# Patient Record
Sex: FEMALE | Race: WHITE | NOT HISPANIC OR LATINO | URBAN - METROPOLITAN AREA
[De-identification: names, ages, dates, MRNs, and addresses within clinical notes are randomized per-mention and may not be internally consistent; named-entity substitution may affect disease eponyms.]

---

## 2019-05-04 ENCOUNTER — INPATIENT (INPATIENT)
Facility: HOSPITAL | Age: 64
LOS: 1 days | Discharge: ANOTHER HOSPITAL | DRG: 282 | End: 2019-05-06
Attending: FAMILY MEDICINE | Admitting: INTERNAL MEDICINE
Payer: COMMERCIAL

## 2019-05-04 VITALS
RESPIRATION RATE: 16 BRPM | HEIGHT: 64 IN | SYSTOLIC BLOOD PRESSURE: 180 MMHG | DIASTOLIC BLOOD PRESSURE: 101 MMHG | OXYGEN SATURATION: 98 % | TEMPERATURE: 99 F | HEART RATE: 95 BPM | WEIGHT: 128.09 LBS

## 2019-05-04 PROBLEM — Z00.00 ENCOUNTER FOR PREVENTIVE HEALTH EXAMINATION: Status: ACTIVE | Noted: 2019-05-04

## 2019-05-04 LAB
ALBUMIN SERPL ELPH-MCNC: 4 G/DL — SIGNIFICANT CHANGE UP (ref 3.3–5)
ALP SERPL-CCNC: 114 U/L — SIGNIFICANT CHANGE UP (ref 40–120)
ALT FLD-CCNC: 25 U/L — SIGNIFICANT CHANGE UP (ref 12–78)
ANION GAP SERPL CALC-SCNC: 8 MMOL/L — SIGNIFICANT CHANGE UP (ref 5–17)
APTT BLD: 32.3 SEC — SIGNIFICANT CHANGE UP (ref 28.5–37)
AST SERPL-CCNC: 21 U/L — SIGNIFICANT CHANGE UP (ref 15–37)
BILIRUB SERPL-MCNC: 0.3 MG/DL — SIGNIFICANT CHANGE UP (ref 0.2–1.2)
BUN SERPL-MCNC: 21 MG/DL — SIGNIFICANT CHANGE UP (ref 7–23)
CALCIUM SERPL-MCNC: 8.2 MG/DL — LOW (ref 8.5–10.1)
CHLORIDE SERPL-SCNC: 100 MMOL/L — SIGNIFICANT CHANGE UP (ref 96–108)
CK MB BLD-MCNC: 1 % — SIGNIFICANT CHANGE UP (ref 0–3.5)
CK MB CFR SERPL CALC: 1.2 NG/ML — SIGNIFICANT CHANGE UP (ref 0–3.6)
CK SERPL-CCNC: 125 U/L — SIGNIFICANT CHANGE UP (ref 26–192)
CO2 SERPL-SCNC: 27 MMOL/L — SIGNIFICANT CHANGE UP (ref 22–31)
CREAT SERPL-MCNC: 0.88 MG/DL — SIGNIFICANT CHANGE UP (ref 0.5–1.3)
GLUCOSE SERPL-MCNC: 122 MG/DL — HIGH (ref 70–99)
HCT VFR BLD CALC: 36.3 % — SIGNIFICANT CHANGE UP (ref 34.5–45)
HGB BLD-MCNC: 12.5 G/DL — SIGNIFICANT CHANGE UP (ref 11.5–15.5)
INR BLD: 0.91 RATIO — SIGNIFICANT CHANGE UP (ref 0.88–1.16)
MCHC RBC-ENTMCNC: 33.3 PG — SIGNIFICANT CHANGE UP (ref 27–34)
MCHC RBC-ENTMCNC: 34.4 GM/DL — SIGNIFICANT CHANGE UP (ref 32–36)
MCV RBC AUTO: 96.8 FL — SIGNIFICANT CHANGE UP (ref 80–100)
NRBC # BLD: 0 /100 WBCS — SIGNIFICANT CHANGE UP (ref 0–0)
PLATELET # BLD AUTO: 237 K/UL — SIGNIFICANT CHANGE UP (ref 150–400)
POTASSIUM SERPL-MCNC: 4.1 MMOL/L — SIGNIFICANT CHANGE UP (ref 3.5–5.3)
POTASSIUM SERPL-SCNC: 4.1 MMOL/L — SIGNIFICANT CHANGE UP (ref 3.5–5.3)
PROT SERPL-MCNC: 7.3 G/DL — SIGNIFICANT CHANGE UP (ref 6–8.3)
PROTHROM AB SERPL-ACNC: 10.4 SEC — SIGNIFICANT CHANGE UP (ref 10–12.9)
RBC # BLD: 3.75 M/UL — LOW (ref 3.8–5.2)
RBC # FLD: 12.6 % — SIGNIFICANT CHANGE UP (ref 10.3–14.5)
SODIUM SERPL-SCNC: 135 MMOL/L — SIGNIFICANT CHANGE UP (ref 135–145)
TROPONIN I SERPL-MCNC: <.015 NG/ML — SIGNIFICANT CHANGE UP (ref 0.01–0.04)
WBC # BLD: 9.55 K/UL — SIGNIFICANT CHANGE UP (ref 3.8–10.5)
WBC # FLD AUTO: 9.55 K/UL — SIGNIFICANT CHANGE UP (ref 3.8–10.5)

## 2019-05-04 PROCEDURE — 93010 ELECTROCARDIOGRAM REPORT: CPT

## 2019-05-04 PROCEDURE — 71045 X-RAY EXAM CHEST 1 VIEW: CPT | Mod: 26

## 2019-05-04 NOTE — ED PROVIDER NOTE - NOTES
agrees with trop x 2 and d/c,  fu with dr cooper this week consulted called to service for dr angela in am

## 2019-05-04 NOTE — ED PROVIDER NOTE - CHPI ED SYMPTOMS NEG
no nausea/no shortness of breath/no back pain/no cough/no dizziness/no fever/no syncope/no chills/no diaphoresis

## 2019-05-04 NOTE — ED PROVIDER NOTE - PROGRESS NOTE DETAILS
pt has never seen dr cooper, she was her mothers doctor, pt with elevated trop, will call consult with omer [facility] for am case evangelina Puente and SAUD Vang

## 2019-05-04 NOTE — ED PROVIDER NOTE - CARE PLAN
Principal Discharge DX:	Palpitations Principal Discharge DX:	Palpitations  Secondary Diagnosis:	Elevated troponin I level Principal Discharge DX:	NSTEMI (non-ST elevated myocardial infarction)  Secondary Diagnosis:	Elevated troponin I level

## 2019-05-04 NOTE — ED ADULT NURSE NOTE - OBJECTIVE STATEMENT
Patient alert and oriented X 3. Complaining of palpitation and chest pressure while watching tv at 8 pm. Denies  shortness of breath, nausea, vomiting, headache, dizziness and fever.

## 2019-05-04 NOTE — ED ADULT NURSE NOTE - CHPI ED NUR SYMPTOMS NEG
no chills/no fever/no congestion/no syncope/no diaphoresis/no dizziness/no nausea/no vomiting/no shortness of breath/no back pain

## 2019-05-04 NOTE — ED PROVIDER NOTE - CPE EDP RESP NORM
Called patient and spouse Valeria answered. Informed wife about study and stated understanding and had no questions or concerns at this time.    normal...

## 2019-05-04 NOTE — ED ADULT NURSE NOTE - CAS DISCH ACCOMP BY
Infant with diffuse bruising over entire body. Trunk, abdomen, and extremities with significant bruising. Prophylactic phototherapy initiated.   4/14 Bili 3.8/0.4; increased to double phototherapy.   4/15 T/D bili 4.4/0.4.   4/16 Bili 4.9/0.5.  4/17 T/D bili 3.3/1.0 (direct rising). Decreased to single phototherapy.   4/18 T/D 3.6/0.7 direct decreasing.   4/19 T/D essentially unchanged at 3.6/0.5.   4/21 Bili 2.5/0.9  4/22 Bili 1.8/0.7  Plan: Will discontinue single phototherapy. T/D bili in am.   Self

## 2019-05-04 NOTE — ED PROVIDER NOTE - OBJECTIVE STATEMENT
PT is a 65 yo female smoker, no other cardiac risk factors. pt states tonight with 15 minute episode of racing palpitations with about 15 seconds of chest pressure, no sob, leg pain or swelling, f/c, diaphoresis, dizziness. pt states sx resolved on own but came to get checked out    pmd: ayla

## 2019-05-05 DIAGNOSIS — Z29.9 ENCOUNTER FOR PROPHYLACTIC MEASURES, UNSPECIFIED: ICD-10-CM

## 2019-05-05 DIAGNOSIS — H04.123 DRY EYE SYNDROME OF BILATERAL LACRIMAL GLANDS: ICD-10-CM

## 2019-05-05 DIAGNOSIS — R74.8 ABNORMAL LEVELS OF OTHER SERUM ENZYMES: ICD-10-CM

## 2019-05-05 DIAGNOSIS — I21.4 NON-ST ELEVATION (NSTEMI) MYOCARDIAL INFARCTION: ICD-10-CM

## 2019-05-05 DIAGNOSIS — Z72.0 TOBACCO USE: ICD-10-CM

## 2019-05-05 LAB
CK MB BLD-MCNC: 1.1 % — SIGNIFICANT CHANGE UP (ref 0–3.5)
CK MB BLD-MCNC: 1.7 % — SIGNIFICANT CHANGE UP (ref 0–3.5)
CK MB CFR SERPL CALC: 1.1 NG/ML — SIGNIFICANT CHANGE UP (ref 0–3.6)
CK MB CFR SERPL CALC: 1.6 NG/ML — SIGNIFICANT CHANGE UP (ref 0–3.6)
CK SERPL-CCNC: 100 U/L — SIGNIFICANT CHANGE UP (ref 26–192)
CK SERPL-CCNC: 94 U/L — SIGNIFICANT CHANGE UP (ref 26–192)
TROPONIN I SERPL-MCNC: 0.05 NG/ML — HIGH (ref 0.01–0.04)
TROPONIN I SERPL-MCNC: 0.05 NG/ML — HIGH (ref 0.01–0.04)
TROPONIN I SERPL-MCNC: 0.08 NG/ML — HIGH (ref 0.01–0.04)
TROPONIN I SERPL-MCNC: 0.11 NG/ML — HIGH (ref 0.01–0.04)

## 2019-05-05 PROCEDURE — 99223 1ST HOSP IP/OBS HIGH 75: CPT | Mod: AI,GC

## 2019-05-05 PROCEDURE — 99223 1ST HOSP IP/OBS HIGH 75: CPT

## 2019-05-05 PROCEDURE — 99285 EMERGENCY DEPT VISIT HI MDM: CPT

## 2019-05-05 RX ORDER — ATORVASTATIN CALCIUM 80 MG/1
40 TABLET, FILM COATED ORAL AT BEDTIME
Qty: 0 | Refills: 0 | Status: DISCONTINUED | OUTPATIENT
Start: 2019-05-05 | End: 2019-05-06

## 2019-05-05 RX ORDER — ASPIRIN/CALCIUM CARB/MAGNESIUM 324 MG
325 TABLET ORAL ONCE
Qty: 0 | Refills: 0 | Status: COMPLETED | OUTPATIENT
Start: 2019-05-05 | End: 2019-05-05

## 2019-05-05 RX ORDER — ASPIRIN/CALCIUM CARB/MAGNESIUM 324 MG
325 TABLET ORAL DAILY
Qty: 0 | Refills: 0 | Status: DISCONTINUED | OUTPATIENT
Start: 2019-05-05 | End: 2019-05-06

## 2019-05-05 RX ORDER — CLOPIDOGREL BISULFATE 75 MG/1
300 TABLET, FILM COATED ORAL ONCE
Qty: 0 | Refills: 0 | Status: COMPLETED | OUTPATIENT
Start: 2019-05-05 | End: 2019-05-05

## 2019-05-05 RX ORDER — CLOPIDOGREL BISULFATE 75 MG/1
75 TABLET, FILM COATED ORAL DAILY
Qty: 0 | Refills: 0 | Status: DISCONTINUED | OUTPATIENT
Start: 2019-05-05 | End: 2019-05-06

## 2019-05-05 RX ORDER — ENOXAPARIN SODIUM 100 MG/ML
60 INJECTION SUBCUTANEOUS ONCE
Qty: 0 | Refills: 0 | Status: COMPLETED | OUTPATIENT
Start: 2019-05-05 | End: 2019-05-05

## 2019-05-05 RX ORDER — METOPROLOL TARTRATE 50 MG
25 TABLET ORAL DAILY
Qty: 0 | Refills: 0 | Status: DISCONTINUED | OUTPATIENT
Start: 2019-05-05 | End: 2019-05-06

## 2019-05-05 RX ORDER — ENOXAPARIN SODIUM 100 MG/ML
60 INJECTION SUBCUTANEOUS EVERY 12 HOURS
Qty: 0 | Refills: 0 | Status: DISCONTINUED | OUTPATIENT
Start: 2019-05-05 | End: 2019-05-06

## 2019-05-05 RX ADMIN — CLOPIDOGREL BISULFATE 300 MILLIGRAM(S): 75 TABLET, FILM COATED ORAL at 12:24

## 2019-05-05 RX ADMIN — ENOXAPARIN SODIUM 60 MILLIGRAM(S): 100 INJECTION SUBCUTANEOUS at 12:24

## 2019-05-05 RX ADMIN — ENOXAPARIN SODIUM 60 MILLIGRAM(S): 100 INJECTION SUBCUTANEOUS at 21:35

## 2019-05-05 RX ADMIN — ATORVASTATIN CALCIUM 40 MILLIGRAM(S): 80 TABLET, FILM COATED ORAL at 21:35

## 2019-05-05 RX ADMIN — Medication 325 MILLIGRAM(S): at 03:19

## 2019-05-05 NOTE — H&P ADULT - PROBLEM SELECTOR PLAN 1
Admit to telemetry. Consider NSTEMI/unstable angina  - Troponin trending upward: 0.82-->.110  - Continue to trend cardiac enzymes. Repeat ekg if patient symptomatic  - Continue asa 325mg qd  - Plavix load given in ED. Continue plavix 75mg qd  - Start full dose lovenox 60mg bid  - Start Toprol 25mg qd  - Check ECHO  - Cardio consulted- Dr. Vang following. Pt aware of possible cardiac cath at [place]. Continue to monitor for now. Admit to telemetry. Consider NSTEMI/unstable angina  - Troponin trending upward: 0.82-->.110  - Continue to trend cardiac enzymes. Repeat ekg if patient symptomatic  - Continue asa 325mg qd  - Plavix load given in ED. Continue plavix 75mg qd  - Start full dose lovenox 60mg bid  - Start Toprol 25mg qd  - Start statin. Check lipid profile in AM  - Check ECHO  - Cardio consulted- Dr. Vang following. Pt aware of possible cardiac cath at [place]. Continue to monitor for now. NPO after midnight.

## 2019-05-05 NOTE — H&P ADULT - NSHPPHYSICALEXAM_GEN_ALL_CORE
none General: Well developed, well nourished, NAD  HEENT: NCAT, PERRLA, EOMI bl, moist mucous membranes   Neck: Supple, nontender, no mass  Neurology: A&Ox3, nonfocal  Respiratory: CTA B/L, No W/R/R  CV: RRR, +S1/S2, no murmurs, rubs or gallops  Abdominal: Soft, NT, ND +BSx4, no guarding, no rebound  Extremities: No C/C/E, + peripheral pulses  MSK: Normal ROM, no joint erythema or warmth, no joint swelling   Skin: warm, dry, normal color

## 2019-05-05 NOTE — CONSULT NOTE ADULT - SUBJECTIVE AND OBJECTIVE BOX
CHIEF COMPLAINT: Patient is a 64y old  Female who presents with a chief complaint of  chest pain  HPI:  64 F hx of smoker, chronic back pain, hematuria presents with palpitations and chest pain. She was sitting at home watching TV and all od sudden felt palpitations that lasted for about 15 minutes and was self limiting. She noted about 10 seconds of pressure like substernal chest pain that was self limiting as well. She came to ED for evaluation.   AT baseline her ET is limited by chest pain. Denies any dyspnea, PND, orthopnea, near syncope, syncope, lower extremity edema, stroke like symptoms.     EKG: SR with nonspecific ST changes    REVIEW OF SYSTEMS:   All other review of systems are negative unless indicated above    PAST MEDICAL & SURGICAL HISTORY:  No pertinent past medical history  No significant past surgical history      SOCIAL HISTORY:  + tobacco no ethanol, or drug abuse.    FAMILY HISTORY:  + CAD with mom with CABG in 80s  No family history of acute MI or sudden cardiac death.    MEDICATIONS  (STANDING):    MEDICATIONS  (PRN):      Allergies    No Known Allergies    Intolerances        Home meds:  Home Medications:  Restasis 0.05% ophthalmic emulsion: 1 drop(s) to each affected eye every 12 hours (04 May 2019 21:25)        VITAL SIGNS:   Vital Signs Last 24 Hrs  T(C): 37 (05 May 2019 02:45), Max: 37 (04 May 2019 21:21)  T(F): 98.6 (05 May 2019 02:45), Max: 98.6 (04 May 2019 21:21)  HR: 77 (05 May 2019 05:50) (69 - 95)  BP: 117/57 (05 May 2019 05:50) (117/57 - 180/101)  BP(mean): --  RR: 16 (05 May 2019 05:50) (16 - 16)  SpO2: 99% (05 May 2019 05:50) (98% - 99%)    I&O's Summary      On Exam:     Constitutional: NAD, awake and alert, well-developed  HEENT: Moist Mucous Membranes, Anicteric  Pulmonary: Non-labored, breath sounds are clear bilaterally, No wheezing, rales or rhonchi  Cardiovascular: Regular, S1 and S2, No murmurs, rubs, gallops or clicks  Gastrointestinal: Bowel Sounds present, soft, nontender.   Lymph: No peripheral edema. No lymphadenopathy.  Skin: No visible rashes or ulcers.  Psych:  Mood & affect appropriate    LABS: All Labs Reviewed:                        12.5   9.55  )-----------( 237      ( 04 May 2019 21:45 )             36.3     04 May 2019 21:45    135    |  100    |  21     ----------------------------<  122    4.1     |  27     |  0.88     Ca    8.2        04 May 2019 21:45    TPro  7.3    /  Alb  4.0    /  TBili  0.3    /  DBili  x      /  AST  21     /  ALT  25     /  AlkPhos  114    04 May 2019 21:45    PT/INR - ( 04 May 2019 21:45 )   PT: 10.4 sec;   INR: 0.91 ratio         PTT - ( 04 May 2019 21:45 )  PTT:32.3 sec  CARDIAC MARKERS ( 05 May 2019 05:50 )  .110 ng/mL / x     / x     / x     / x      CARDIAC MARKERS ( 05 May 2019 00:17 )  .082 ng/mL / x     / x     / x     / x      CARDIAC MARKERS ( 04 May 2019 21:45 )  <.015 ng/mL / x     / 125 U/L / x     / 1.2 ng/mL      Blood Culture:         RADIOLOGY:

## 2019-05-05 NOTE — ED ADULT NURSE REASSESSMENT NOTE - ANCILLARY STATUS
cardiovascular tests pending/lab results pending/cath cath tomorrow, another set of cardiac enzymes at 2200

## 2019-05-05 NOTE — H&P ADULT - NSHPSOCIALHISTORY_GEN_ALL_CORE
Lives with .   Retired.   Tobacco: current tobacco smoker, 7-8 cigs for ~ 30 years  Etoh: denies  Recreational drugs: smokes marijuana, last smoked 5/4

## 2019-05-05 NOTE — ED ADULT NURSE REASSESSMENT NOTE - NSIMPLEMENTINTERV_GEN_ALL_ED
Implemented All Fall with Harm Risk Interventions:  [place] to call system. Call bell, personal items and telephone within reach. Instruct patient to call for assistance. Room bathroom lighting operational. Non-slip footwear when patient is off stretcher. Physically safe environment: no spills, clutter or unnecessary equipment. Stretcher in lowest position, wheels locked, appropriate side rails in place. Provide visual cue, wrist band, yellow gown, etc. Monitor gait and stability. Monitor for mental status changes and reorient to person, place, and time. Review medications for side effects contributing to fall risk. Reinforce activity limits and safety measures with patient and family. Provide visual clues: red socks.

## 2019-05-05 NOTE — H&P ADULT - HISTORY OF PRESENT ILLNESS
64F w/pmh of chronic back pain and current tobacco smoker presenting with heart racing and mild chest pressure since last night at 8:30pm. States that she was in her usual state of health, watching television when she felt her heart beating very fast and admits to mild midsternal chest pressure without radiation, numbness or tingling. Chest pressure is not reproducible and pt denies chest pain. This episode lasted 15 minutes and started to improve by itself. She did not take any medications to improve her symptoms. States that she felt her heart racing once before a couple of months ago when she got up too fast and this episode was relieved after lying down and resting. Denies fevers/chills, n/v, chest pain, sob, cough, abdominal pain, constipation/diarrhea, weakness or muscle aches. No recent travel or sick contacts. Currently smokes 7-8 cigarettes daily for ~30 years.     In the ED, T98.6F, HR 69, /56, RR 16 and SpO2 98% on room air. Significant labs include: CBC wnl, CMP wnl, first set of troponin negative but trended up to 0.82-->.110. She was givent Lovenox 60mg x1, Plavix 300mg PO x1, asa 325mg x1.  EKG: NSR, no ST elevations/depression, repeat ekg showed NSR, Hr 95, no ST elevation/depression  CXR: prelim read no opacities/consolidations. Pending official read.

## 2019-05-05 NOTE — H&P ADULT - ASSESSMENT
64F w/pmh of chronic back pain and current tobacco smoker presenting with heart racing and mild chest pressure admitted with palpitations, atypical chest pain, and elevated troponin.

## 2019-05-05 NOTE — CONSULT NOTE ADULT - ASSESSMENT
64 F hx of smoker, chronic back pain, hematuria presents with palpitations and chest pain.   - Now with mild trop  - Trend [place]  - Please add CPK/CKMB to drawn trops  - Start ASA 325mg Qday  - Give Plavix 300mg x 1 and then 75mg Qday  - Would give full dose lovenox for now  - Start Toprol 25mg Qday  - Admit to tele  - check echo  - Monitor and replete electrolytes. Keep K>4.0 and Mg>2.0.   - Spoke at length with pt and  regarding ischemic evaluation. Offered cardiac cath.   - Further cardiac workup will depend on clinical course.   - All other workup per primary team. Will followup.

## 2019-05-05 NOTE — ED ADULT NURSE REASSESSMENT NOTE - NS ED NURSE REASSESS COMMENT FT1
Patient ambulated multiple times with steady gait to bathroom. No palpitations or chest pressure at this time. NSR noted on cardiac monitor. Respirations even and not labored. Pending repeat troponin and cardiology consult. Carli ANDINO
pt maintained, denies chest pain/palpitations. VSS. No diaphoresis. Pt remains on CM. Pending bed placement.
Report taken from Rosas ALTMAN RN. Pt received alert and oriented x 4, denies any chest pain, sob, dizziness, palpitations. VSS. CE due 2200. Cardiac Cath tomorrow, NPO status effective at 2359 tonight. 20 g right ac. No s/s of acute distress.

## 2019-05-05 NOTE — H&P ADULT - NSHPREVIEWOFSYSTEMS_GEN_ALL_CORE
Constitutional: denies fever, chills, diaphoresis   HEENT: denies blurry vision, difficulty hearing  Respiratory: denies SOB, MARTINEZ, cough, sputum production, wheezing, hemoptysis  Cardiovascular: denies CP, palpitations, edema  Gastrointestinal: denies nausea, vomiting, diarrhea, constipation, abdominal pain  Genitourinary: denies dysuria, frequency, urgency  Skin/Breast: denies rash, itching  Musculoskeletal: denies myalgias, joint swelling, muscle weakness  Neurologic: denies headache, weakness, dizziness, paresthesias, numbness/tingling  Psychiatric: denies feeling anxious

## 2019-05-05 NOTE — H&P ADULT - PROBLEM SELECTOR PLAN 3
Current smoker 7-8 cigs/daily. Offered nicotine patch and tobacco cessation. Pt not willing to quit currently. Does not require patch.

## 2019-05-05 NOTE — H&P ADULT - ATTENDING COMMENTS
pt is 64 year od, female with extensive h/o smoking admitted for ACS , elevated troponin   seen by card in er   c/w asa , plavix ,  beta blocker  , full dose anticoagulation with lovenox   trend and f/u cardiac enzymes   start on lipitor   possible cath in am   fall precaution

## 2019-05-05 NOTE — H&P ADULT - PROBLEM SELECTOR PLAN 4
Continue restasis eye drops bid. Asked patient to bring eye drops from home and/or offered eye drops if needed.

## 2019-05-05 NOTE — H&P ADULT - PROBLEM SELECTOR PLAN 5
IMPROVE VTE Individual Risk Assessment          RISK                                                          Points  [  ] Previous VTE                                                3  [  ] Thrombophilia                                             2  [  ] Lower limb paralysis                                   2        (unable to hold up >15 seconds)    [  ] Current Cancer                                             2         (within 6 months)  [  ] Immobilization > 24 hrs                              1  [  ] ICU/CCU stay > 24 hours                             1  [ x ] Age > 60                                                         1    IMPROVE VTE Score: 1    dvt ppx: full dose lovenox

## 2019-05-06 ENCOUNTER — INPATIENT (INPATIENT)
Facility: HOSPITAL | Age: 64
LOS: 0 days | Discharge: HOME | DRG: 287 | End: 2019-05-06
Attending: INTERNAL MEDICINE | Admitting: INTERNAL MEDICINE
Payer: COMMERCIAL

## 2019-05-06 ENCOUNTER — TRANSCRIPTION ENCOUNTER (OUTPATIENT)
Age: 64
End: 2019-05-06

## 2019-05-06 VITALS
RESPIRATION RATE: 17 BRPM | DIASTOLIC BLOOD PRESSURE: 62 MMHG | OXYGEN SATURATION: 97 % | HEART RATE: 74 BPM | SYSTOLIC BLOOD PRESSURE: 132 MMHG

## 2019-05-06 VITALS
TEMPERATURE: 98 F | SYSTOLIC BLOOD PRESSURE: 130 MMHG | OXYGEN SATURATION: 99 % | RESPIRATION RATE: 15 BRPM | HEART RATE: 64 BPM | DIASTOLIC BLOOD PRESSURE: 76 MMHG

## 2019-05-06 VITALS
RESPIRATION RATE: 17 BRPM | WEIGHT: 126.99 LBS | HEART RATE: 61 BPM | OXYGEN SATURATION: 99 % | HEIGHT: 64 IN | DIASTOLIC BLOOD PRESSURE: 86 MMHG | SYSTOLIC BLOOD PRESSURE: 144 MMHG | TEMPERATURE: 98 F

## 2019-05-06 DIAGNOSIS — F17.200 NICOTINE DEPENDENCE, UNSPECIFIED, UNCOMPLICATED: ICD-10-CM

## 2019-05-06 DIAGNOSIS — R94.30 ABNORMAL RESULT OF CARDIOVASCULAR FUNCTION STUDY, UNSPECIFIED: ICD-10-CM

## 2019-05-06 LAB
ANION GAP SERPL CALC-SCNC: 8 MMOL/L — SIGNIFICANT CHANGE UP (ref 5–17)
BUN SERPL-MCNC: 11 MG/DL — SIGNIFICANT CHANGE UP (ref 7–23)
CALCIUM SERPL-MCNC: 8.5 MG/DL — SIGNIFICANT CHANGE UP (ref 8.5–10.1)
CHLORIDE SERPL-SCNC: 107 MMOL/L — SIGNIFICANT CHANGE UP (ref 96–108)
CHOLEST SERPL-MCNC: 213 MG/DL — HIGH (ref 10–199)
CK MB BLD-MCNC: <1.1 % — SIGNIFICANT CHANGE UP (ref 0–3.5)
CK MB CFR SERPL CALC: <1 NG/ML — SIGNIFICANT CHANGE UP (ref 0–3.6)
CK SERPL-CCNC: 90 U/L — SIGNIFICANT CHANGE UP (ref 26–192)
CO2 SERPL-SCNC: 27 MMOL/L — SIGNIFICANT CHANGE UP (ref 22–31)
CREAT SERPL-MCNC: 0.69 MG/DL — SIGNIFICANT CHANGE UP (ref 0.5–1.3)
GLUCOSE SERPL-MCNC: 93 MG/DL — SIGNIFICANT CHANGE UP (ref 70–99)
HCT VFR BLD CALC: 36.2 % — SIGNIFICANT CHANGE UP (ref 34.5–45)
HCV AB S/CO SERPL IA: 0.18 S/CO — SIGNIFICANT CHANGE UP (ref 0–0.99)
HCV AB SERPL-IMP: SIGNIFICANT CHANGE UP
HDLC SERPL-MCNC: 73 MG/DL — SIGNIFICANT CHANGE UP
HGB BLD-MCNC: 12.3 G/DL — SIGNIFICANT CHANGE UP (ref 11.5–15.5)
LIPID PNL WITH DIRECT LDL SERPL: 125 MG/DL — HIGH
MCHC RBC-ENTMCNC: 32.5 PG — SIGNIFICANT CHANGE UP (ref 27–34)
MCHC RBC-ENTMCNC: 34 GM/DL — SIGNIFICANT CHANGE UP (ref 32–36)
MCV RBC AUTO: 95.8 FL — SIGNIFICANT CHANGE UP (ref 80–100)
NRBC # BLD: 0 /100 WBCS — SIGNIFICANT CHANGE UP (ref 0–0)
PLATELET # BLD AUTO: 219 K/UL — SIGNIFICANT CHANGE UP (ref 150–400)
POTASSIUM SERPL-MCNC: 4 MMOL/L — SIGNIFICANT CHANGE UP (ref 3.5–5.3)
POTASSIUM SERPL-SCNC: 4 MMOL/L — SIGNIFICANT CHANGE UP (ref 3.5–5.3)
RBC # BLD: 3.78 M/UL — LOW (ref 3.8–5.2)
RBC # FLD: 12.6 % — SIGNIFICANT CHANGE UP (ref 10.3–14.5)
SODIUM SERPL-SCNC: 142 MMOL/L — SIGNIFICANT CHANGE UP (ref 135–145)
TOTAL CHOLESTEROL/HDL RATIO MEASUREMENT: 2.9 RATIO — LOW (ref 3.3–7.1)
TRIGL SERPL-MCNC: 76 MG/DL — SIGNIFICANT CHANGE UP (ref 10–149)
TROPONIN I SERPL-MCNC: 0.03 NG/ML — SIGNIFICANT CHANGE UP (ref 0.01–0.04)
WBC # BLD: 5.77 K/UL — SIGNIFICANT CHANGE UP (ref 3.8–10.5)
WBC # FLD AUTO: 5.77 K/UL — SIGNIFICANT CHANGE UP (ref 3.8–10.5)

## 2019-05-06 PROCEDURE — 93010 ELECTROCARDIOGRAM REPORT: CPT

## 2019-05-06 PROCEDURE — 80053 COMPREHEN METABOLIC PANEL: CPT

## 2019-05-06 PROCEDURE — 82553 CREATINE MB FRACTION: CPT

## 2019-05-06 PROCEDURE — 85730 THROMBOPLASTIN TIME PARTIAL: CPT

## 2019-05-06 PROCEDURE — 82550 ASSAY OF CK (CPK): CPT

## 2019-05-06 PROCEDURE — 99152 MOD SED SAME PHYS/QHP 5/>YRS: CPT | Mod: 59,GC

## 2019-05-06 PROCEDURE — 80048 BASIC METABOLIC PNL TOTAL CA: CPT

## 2019-05-06 PROCEDURE — 93005 ELECTROCARDIOGRAM TRACING: CPT

## 2019-05-06 PROCEDURE — C1894: CPT

## 2019-05-06 PROCEDURE — 85610 PROTHROMBIN TIME: CPT

## 2019-05-06 PROCEDURE — C1887: CPT

## 2019-05-06 PROCEDURE — 99152 MOD SED SAME PHYS/QHP 5/>YRS: CPT

## 2019-05-06 PROCEDURE — 93454 CORONARY ARTERY ANGIO S&I: CPT

## 2019-05-06 PROCEDURE — 93454 CORONARY ARTERY ANGIO S&I: CPT | Mod: 26,GC

## 2019-05-06 PROCEDURE — 99285 EMERGENCY DEPT VISIT HI MDM: CPT | Mod: 25

## 2019-05-06 PROCEDURE — 80061 LIPID PANEL: CPT

## 2019-05-06 PROCEDURE — 86803 HEPATITIS C AB TEST: CPT

## 2019-05-06 PROCEDURE — 85027 COMPLETE CBC AUTOMATED: CPT

## 2019-05-06 PROCEDURE — 71045 X-RAY EXAM CHEST 1 VIEW: CPT

## 2019-05-06 PROCEDURE — 36415 COLL VENOUS BLD VENIPUNCTURE: CPT

## 2019-05-06 PROCEDURE — 84484 ASSAY OF TROPONIN QUANT: CPT

## 2019-05-06 PROCEDURE — 99239 HOSP IP/OBS DSCHRG MGMT >30: CPT | Mod: GC

## 2019-05-06 PROCEDURE — C1769: CPT

## 2019-05-06 PROCEDURE — 99233 SBSQ HOSP IP/OBS HIGH 50: CPT

## 2019-05-06 RX ORDER — ATORVASTATIN CALCIUM 80 MG/1
1 TABLET, FILM COATED ORAL
Qty: 0 | Refills: 0 | COMMUNITY
Start: 2019-05-06

## 2019-05-06 RX ORDER — CLOPIDOGREL BISULFATE 75 MG/1
1 TABLET, FILM COATED ORAL
Qty: 0 | Refills: 0 | COMMUNITY
Start: 2019-05-06

## 2019-05-06 RX ORDER — ASPIRIN/CALCIUM CARB/MAGNESIUM 324 MG
1 TABLET ORAL
Qty: 0 | Refills: 0 | COMMUNITY
Start: 2019-05-06

## 2019-05-06 RX ORDER — ENOXAPARIN SODIUM 100 MG/ML
1 INJECTION SUBCUTANEOUS
Qty: 0 | Refills: 0 | COMMUNITY

## 2019-05-06 RX ORDER — METOPROLOL TARTRATE 50 MG
1 TABLET ORAL
Qty: 0 | Refills: 0 | COMMUNITY
Start: 2019-05-06

## 2019-05-06 RX ADMIN — ENOXAPARIN SODIUM 60 MILLIGRAM(S): 100 INJECTION SUBCUTANEOUS at 05:53

## 2019-05-06 RX ADMIN — CLOPIDOGREL BISULFATE 75 MILLIGRAM(S): 75 TABLET, FILM COATED ORAL at 09:51

## 2019-05-06 RX ADMIN — Medication 25 MILLIGRAM(S): at 05:53

## 2019-05-06 RX ADMIN — Medication 325 MILLIGRAM(S): at 09:51

## 2019-05-06 NOTE — DISCHARGE NOTE PROVIDER - CARE PROVIDER_API CALL
Nhan Vang)  Internal Medicine  [address] [place] [address]  Phone: [phone]  Fax: [phone]  Follow Up Time:

## 2019-05-06 NOTE — ED ADULT NURSE REASSESSMENT NOTE - COMFORT CARE
darkened lights/side rails up/wait time explained/warm blanket provided
Informed pt of NPO at midnight/warm blanket provided/ambulated to bathroom/plan of care explained

## 2019-05-06 NOTE — DISCHARGE NOTE PROVIDER - CARE PROVIDER_API CALL
Nhan Vang)  Internal Medicine  [address] [place] [address]  Phone: [phone]  Fax: [phone]  Follow Up Time: 1 week    Gurpreet Barker  [address] [facility], [place] [address]  Phone: [phone]  Fax: (   )    -  Follow Up Time: 1 week

## 2019-05-06 NOTE — DISCHARGE NOTE NURSING/CASE MANAGEMENT/SOCIAL WORK - NSDCPEWEB_GEN_ALL_CORE
[facility] for Tobacco Control website --- [email or web address] website --- [email or web address]

## 2019-05-06 NOTE — PROGRESS NOTE ADULT - ASSESSMENT
64 F hx of smoker, chronic back pain, hematuria presents with palpitations and chest pain, ruled in for NSTEMI    - Needs transfer to tertiary facility today for coronary angiography in the management of her NSTEMI  - Continue DAPT with aspirin and Plavix  - AM Lovenox held  - Continue Toprol  - Continue statin  - Will need 2D echocardiogram  - Monitor and replete electrolytes. Keep K>4.0 and Mg>2.0.   - Further cardiac workup will depend on clinical course.   - To follow patient at

## 2019-05-06 NOTE — DISCHARGE NOTE NURSING/CASE MANAGEMENT/SOCIAL WORK - NSDCDPATPORTLINK_GEN_ALL_CORE
You can access the [facility] Patient Portal, offered by [facility], by registering with the following website: [email or web address]

## 2019-05-06 NOTE — DISCHARGE NOTE PROVIDER - PROVIDER TOKENS
PROVIDER:[TOKEN:[[id number]],FOLLOWUP:[1 week]],FREE:[LAST:[Lucero],FIRST:[Gurpreet],PHONE:[phone]],FAX:[(   )    -],ADDRESS:[[address] [place] [address]],FOLLOWUP:[1 week]]

## 2019-05-06 NOTE — DISCHARGE NOTE PROVIDER - HOSPITAL COURSE
64F w/ PMHx of chronic back pain and current tobacco smoker presenting with heart racing and mild chest pressure on the night of admission at 8:30pm. Stated that she was in her usual state of health, watching television when she felt her heart beating very fast and admitted to mild midsternal chest pressure without radiation, numbness or tingling. Chest pressure was not reproducible and pt denied chest pain. This episode lasted 15 minutes and started to improve by itself. She did not take any medications to improve her symptoms. Stated that she felt her heart racing once before a couple of months ago when she got up too fast and this episode was relieved after lying down and resting. Denies fevers/chills, n/v, chest pain, sob, cough, abdominal pain, constipation/diarrhea, weakness or muscle aches. No recent travel or sick contacts. Currently smokes 7-8 cigarettes daily for ~30 years.         In the ED, T98.6F, HR 69, /56, RR 16 and SpO2 98% on room air. Significant labs include: CBC wnl, CMP wnl, first set of troponin negative but trended up to 0.82-->.110. She was given Lovenox 60mg x1, Plavix 300mg PO x1, asa 325mg x1.    EKG: NSR, no ST elevations/depression, repeat ekg showed NSR, Hr 95, no ST elevation/depression    CXR:  No evidence for focal infiltrate or lobar consolidation.        The patient was admitted to telemetry for an NSTEMI. Cardiology, Dr. Vang was consulted. Cardiac enzymes were trended. Full dose Lovenox, Plavix and  metoprolol were started. Troponin trended x  6. Cardiology has recommended that the patient be transferred to [facility] for a cardiac catheterization for further management of her NSTEMI.  Lovenox was held.  The patient is stable for transfer to [facility] for further management.         Vital Signs Last 24 Hrs    T(C): 36.9 (06 May 2019 05:51), Max: 37.1 (05 May 2019 19:19)    T(F): 98.5 (06 May 2019 05:51), Max: 98.7 (05 May 2019 19:19)    HR: 76 (06 May 2019 05:51) (71 - 76)    BP: 131/76 (06 May 2019 05:51) (118/70 - 143/78)    BP(mean): --    RR: 16 (06 May 2019 05:51) (15 - 16)    SpO2: 99% (06 May 2019 05:51) (96% - 99%) 64F w/ PMHx of chronic back pain and current tobacco smoker presenting with heart racing and mild chest pressure on the night of admission at 8:30pm. Stated that she was in her usual state of health, watching television when she felt her heart beating very fast and admitted to mild midsternal chest pressure without radiation, numbness or tingling. Chest pressure was not reproducible and pt denied chest pain. This episode lasted 15 minutes and started to improve by itself. She did not take any medications to improve her symptoms. Stated that she felt her heart racing once before a couple of months ago when she got up too fast and this episode was relieved after lying down and resting. Denies fevers/chills, n/v, chest pain, sob, cough, abdominal pain, constipation/diarrhea, weakness or muscle aches. No recent travel or sick contacts. Currently smokes 7-8 cigarettes daily for ~30 years.         In the ED, T98.6F, HR 69, /56, RR 16 and SpO2 98% on room air. Significant labs include: CBC wnl, CMP wnl, first set of troponin negative but trended up to 0.82-->.110. She was given Lovenox 60mg x1, Plavix 300mg PO x1, asa 325mg x1.    EKG: NSR, no ST elevations/depression, repeat ekg showed NSR, Hr 95, no ST elevation/depression    CXR:  No evidence for focal infiltrate or lobar consolidation.        The patient was admitted to telemetry for an NSTEMI. Cardiology, Dr. Vang was consulted. Cardiac enzymes were trended. Full dose Lovenox, Plavix, statin and  metoprolol were started. Troponin trended x  6. Cardiology has recommended that the patient be transferred to [facility] for a cardiac catheterization for further management of her NSTEMI.  Lovenox was held.  The patient is stable for transfer to [facility] for further management.         Vital Signs Last 24 Hrs    T(C): 36.9 (06 May 2019 05:51), Max: 37.1 (05 May 2019 19:19)    T(F): 98.5 (06 May 2019 05:51), Max: 98.7 (05 May 2019 19:19)    HR: 76 (06 May 2019 05:51) (71 - 76)    BP: 131/76 (06 May 2019 05:51) (118/70 - 143/78)    BP(mean): --    RR: 16 (06 May 2019 05:51) (15 - 16)    SpO2: 99% (06 May 2019 05:51) (96% - 99%) 64F w/ PMHx of chronic back pain and current tobacco smoker presenting with heart racing and mild chest pressure on the night of admission at 8:30pm. Stated that she was in her usual state of health, watching television when she felt her heart beating very fast and admitted to mild midsternal chest pressure without radiation, numbness or tingling. Chest pressure was not reproducible and pt denied chest pain. This episode lasted 15 minutes and started to improve by itself. She did not take any medications to improve her symptoms. Stated that she felt her heart racing once before a couple of months ago when she got up too fast and this episode was relieved after lying down and resting. Denies fevers/chills, n/v, chest pain, sob, cough, abdominal pain, constipation/diarrhea, weakness or muscle aches. No recent travel or sick contacts. Currently smokes 7-8 cigarettes daily for ~30 years.         In the ED, T98.6F, HR 69, /56, RR 16 and SpO2 98% on room air. Significant labs include: CBC wnl, CMP wnl, first set of troponin negative but trended up to 0.82-->.110. She was given Lovenox 60mg x1, Plavix 300mg PO x1, asa 325mg x1.    EKG: NSR, no ST elevations/depression, repeat ekg showed NSR,     Hr 95, no ST elevation/depression    CXR:  No evidence for focal infiltrate or lobar consolidation.        The patient was admitted to telemetry for an d  cp  sec to NSTEMI. Cardiology as pt is active smoker,     Dr. Angela was consulted. Cardiac enzymes were trended down     Full dose Lovenox, Plavix, statin and  metoprolol were started    . Troponin  were  high sec to NSTEMI .     Cardiology  dr angela  has recommended that the patient be transferred to [facility] for a cardiac catheterization for further management of her     NSTEMI.  Lovenox was held.  The patient is stable for transfer to [facility] for further management. smoking cessation counseling done before dc.    physical exame day of dc        Vital Signs Last 24 Hrs    T(C): 36.9 (06 May 2019 05:51), Max: 37.1 (05 May 2019 19:19)    T(F): 98.5 (06 May 2019 05:51), Max: 98.7 (05 May 2019 19:19)    HR: 76 (06 May 2019 05:51) (71 - 76)    BP: 131/76 (06 May 2019 05:51) (118/70 - 143/78)    BP(mean): --    RR: 16 (06 May 2019 05:51) (15 - 16)    SpO2: 99% (06 May 2019 05:51) (96% - 99%)        physical exam  5-6-19 day of dc   Vital Signs Last 24 Hrs    T(C): 36.9 (06 May 2019 07:41), Max: 37.1 (05 May 2019 19:19)    T(F): 98.5 (06 May 2019 07:41), Max: 98.7 (05 May 2019 19:19)    HR: 64 (06 May 2019 07:41) (64 - 76)    BP: 130/76 (06 May 2019 07:41) (118/70 - 143/78)    BP(mean): --    RR: 15 (06 May 2019 07:41) (15 - 16)    SpO2: 99% (06 May 2019 07:41) (96% - 99%)      GEN no distress , HEENT nt/nc , perrla , CVS s1s2 no tachy ,     CHEST bl air entery  no wheezing , no rale , CNS aao/3 , GI soft , bs present , EXT no edema, pedal pulse present , SKIN no rash.

## 2019-05-06 NOTE — DISCHARGE NOTE PROVIDER - NSDCCPCAREPLAN_GEN_ALL_CORE_FT
PRINCIPAL DISCHARGE DIAGNOSIS  Diagnosis: NSTEMI (non-ST elevated myocardial infarction)  Assessment and Plan of Treatment:       SECONDARY DISCHARGE DIAGNOSES  Diagnosis: Elevated troponin I level  Assessment and Plan of Treatment: PRINCIPAL DISCHARGE DIAGNOSIS  Diagnosis: NSTEMI (non-ST elevated myocardial infarction)  Assessment and Plan of Treatment: You were admitted to the hospital for a non-STEMI. You were monitored on telemetry and your cardiac enzymes were trended. You were seen by cardiology, Dr. Vang. Aspirin, Plavix, atorvastatin, and metoprolol were started. It is recommended that you be transferred to [facility] for cardiac catheterization for further management of your NSTEMI. Please follow up with cardiology, Dr. Nhan Vang after discharge from the hospital.      SECONDARY DISCHARGE DIAGNOSES  Diagnosis: Elevated troponin I level  Assessment and Plan of Treatment: Your cardiac enzyme, troponin was elevated. This has trended downward. You are being transferred to [facility] for a cardiac catheterization for further management. PRINCIPAL DISCHARGE DIAGNOSIS  Diagnosis: NSTEMI (non-ST elevated myocardial infarction)  Assessment and Plan of Treatment: You were admitted to the hospital for a non-STEMI. You were monitored on telemetry and your cardiac enzymes were trended. You were seen by cardiology, Dr. Vang. Aspirin, Plavix, atorvastatin, and metoprolol were started. It is recommended that you be transferred to [facility] for cardiac catheterization for further management of your NSTEMI. Please follow up with cardiology, Dr. Nhan Vang after discharge from the hospital.      SECONDARY DISCHARGE DIAGNOSES  Diagnosis: Elevated troponin I level  Assessment and Plan of Treatment: Your cardiac enzyme, troponin was elevated. This has trended downward. You are being transferred to [facility] for a cardiac catheterization for further management.    Diagnosis: Current tobacco use  Assessment and Plan of Treatment: You are currently smoking 7-8 cigarettes daily. Education was provided on smoking cessation. Please follow up with your primary care doctor for further management.

## 2019-05-06 NOTE — DISCHARGE NOTE PROVIDER - NSDCCPTREATMENT_GEN_ALL_CORE_FT
PRINCIPAL PROCEDURE  Procedure: Left heart cardiac cath  Findings and Treatment: mild RCA disease (official report pending)

## 2019-05-06 NOTE — DISCHARGE NOTE NURSING/CASE MANAGEMENT/SOCIAL WORK - NSDCPEEMAIL_GEN_ALL_CORE
[facility] for Tobacco Control email [email or web address]

## 2019-05-06 NOTE — DISCHARGE NOTE PROVIDER - NSDCACTIVITY_GEN_ALL_CORE
Do not drive or operate machinery/Showering allowed/No heavy lifting/straining/Walking - Outdoors allowed/Walking - Indoors allowed/Bathing allowed

## 2019-05-06 NOTE — DISCHARGE NOTE PROVIDER - CARE PROVIDERS DIRECT ADDRESSES
[email or web address]

## 2019-05-06 NOTE — DISCHARGE NOTE PROVIDER - HOSPITAL COURSE
HPI:    65 y/o  female PMH chronic back pain, hematuria with negative outpatient urology workup, current smoker, presented to [facility] 5/4/19 with c/o heart racing and mild chest pressure. States that she was in her usual state of health, watching television when she felt her heart beating very fast and admits to mild midsternal chest pressure without radiation, numbness or tingling. Chest pressure was not reproducible. This episode lasted 15 minutes and self resolved. Denies fevers/chills, n/v, chest pain, sob, cough, abdominal pain, constipation/diarrhea, weakness or muscle aches. No recent travel or sick contacts. CXR with no acute findings.    Troponins mildly elevated. Cardiology consulted (Dr. Vang) and pt started on ASA/Plavix and Lovenox 60mg BID (last dose @0553 today - Dr. Leonard aware) for ACS. Transferred to [facility] today for cardiac catheterization. (06 May 2019 11:19)    Pt is s/p diagnostic LHC via RRA access revealing mild RCA disease (official report pending). Pt tolerated procedure well with no post complications and hospitalization remained uneventful. Pt is hemodynamically stable and insertion site benign. No c/o chest pain or SOB. Discharge teaching provided to patient and verbalized understanding of instructions. Pt is stable for discharge home as per attending.

## 2019-05-06 NOTE — DISCHARGE NOTE PROVIDER - NSDCCPCAREPLAN_GEN_ALL_CORE_FT
<p>Prior to commencing surgery patient identification, surgical procedure, site, and side were confirmed by Dr. Haris Lopez. Following topical proparacaine anesthesia, the patient was positioned at the YAG laser, a contact lens coupled to the cornea with methylcellulose and an axial posterior capsulotomy performed without complication using 3.2 Mj x 46. One drop of Alphagan was instilled and the patient returned to the holding area having tolerated the procedure well and without complication. </p><p>MRN [id number]</p> PRINCIPAL DISCHARGE DIAGNOSIS  Diagnosis: Chest pain  Assessment and Plan of Treatment: You will remain chest pain free and understand post cath discharge instructions  No heavy lifting or pushing/pulling with procedure arm for 2 weeks. No driving for 2 days. You may shower 24 hours following the procedure but avoid baths/swimming for 1 week. Check your wrist site for bleeding and/or swelling daily following procedure and call your doctor immediately if it occurs or if you experience increased pain at the site. Follow up with your cardiologist in 1-2 weeks. You may call [facility] if you have any questions/concerns regarding your procedure [phone].      SECONDARY DISCHARGE DIAGNOSES  Diagnosis: Hyperlipemia  Assessment and Plan of Treatment: Eat a heart healthy diet that is low in saturated fats and salt, and includes whole grains, fruits, vegetables and lean protein; exercise regularly (consult with your physician or cardiologist first); maintain a heart healthy weight; if you smoke - quit (A resource to help you stop smoking is the [facility] for Tobacco [facility] – phone number [phone].). Continue to follow with your primary physician or cardiologist.

## 2019-05-06 NOTE — H&P CARDIOLOGY - HISTORY OF PRESENT ILLNESS
63 y/o  female PMH chronic back pain, hematuria with negative outpatient urology workup, current smoker, presented to [facility] 5/4/19 with c/o heart racing and mild chest pressure. States that she was in her usual state of health, watching television when she felt her heart beating very fast and admits to mild midsternal chest pressure without radiation, numbness or tingling. Chest pressure was not reproducible. This episode lasted 15 minutes and self resolved. Denies fevers/chills, n/v, chest pain, sob, cough, abdominal pain, constipation/diarrhea, weakness or muscle aches. No recent travel or sick contacts. CXR with no acute findings.  Troponins mildly elevated. Cardiology consulted (Dr. Vang) and pt started on ASA/Plavix and Lovenox 60mg BID (last dose @0553 today - Dr. Leonard aware) for ACS. Transferred to [facility] today for cardiac catheterization.

## 2019-05-09 PROBLEM — R31.9 HEMATURIA, UNSPECIFIED: Chronic | Status: ACTIVE | Noted: 2019-05-06

## 2019-05-09 PROBLEM — H04.123 DRY EYE SYNDROME OF BILATERAL LACRIMAL GLANDS: Chronic | Status: ACTIVE | Noted: 2019-05-05

## 2019-05-09 PROBLEM — Z72.0 TOBACCO USE: Chronic | Status: ACTIVE | Noted: 2019-05-05

## 2019-05-09 PROBLEM — M54.9 DORSALGIA, UNSPECIFIED: Chronic | Status: ACTIVE | Noted: 2019-05-05

## 2019-05-20 ENCOUNTER — APPOINTMENT (OUTPATIENT)
Dept: CARDIOLOGY | Facility: CLINIC | Age: 64
End: 2019-05-20
Payer: COMMERCIAL

## 2019-05-20 ENCOUNTER — NON-APPOINTMENT (OUTPATIENT)
Age: 64
End: 2019-05-20

## 2019-05-20 VITALS
DIASTOLIC BLOOD PRESSURE: 74 MMHG | WEIGHT: 130 LBS | HEART RATE: 70 BPM | SYSTOLIC BLOOD PRESSURE: 139 MMHG | BODY MASS INDEX: 22.2 KG/M2 | HEIGHT: 64 IN | OXYGEN SATURATION: 94 %

## 2019-05-20 DIAGNOSIS — Z87.39 PERSONAL HISTORY OF OTHER DISEASES OF THE MUSCULOSKELETAL SYSTEM AND CONNECTIVE TISSUE: ICD-10-CM

## 2019-05-20 DIAGNOSIS — Z87.448 PERSONAL HISTORY OF OTHER DISEASES OF URINARY SYSTEM: ICD-10-CM

## 2019-05-20 DIAGNOSIS — Z82.49 FAMILY HISTORY OF ISCHEMIC HEART DISEASE AND OTHER DISEASES OF THE CIRCULATORY SYSTEM: ICD-10-CM

## 2019-05-20 DIAGNOSIS — F17.200 NICOTINE DEPENDENCE, UNSPECIFIED, UNCOMPLICATED: ICD-10-CM

## 2019-05-20 DIAGNOSIS — R00.2 PALPITATIONS: ICD-10-CM

## 2019-05-20 DIAGNOSIS — Z86.79 PERSONAL HISTORY OF OTHER DISEASES OF THE CIRCULATORY SYSTEM: ICD-10-CM

## 2019-05-20 PROCEDURE — 99215 OFFICE O/P EST HI 40 MIN: CPT

## 2019-05-20 PROCEDURE — 93000 ELECTROCARDIOGRAM COMPLETE: CPT

## 2019-05-20 NOTE — DISCUSSION/SUMMARY
[With Me] : with me [FreeTextEntry1] : Pippa is a 64 year old female here for evaluation.\par She recently presented with chest fluttering, and was found to have nonobstructive CAD.\par \par Her blood pressure is near goal. Her physical exam is unremarkable. Her EKG demonstrates a sinus rhythm without obvious ischemia or chamber enlargement.\par \par I think her mild troponin leak was present in the setting of palpitations and fast heart rates. She will be set up for a 30 day event monitor to evaluate these symptoms. She will have a 2-D echo to evaluate for any structural heart disease. I recommended that she take any aspirin 81 mg p.o. daily, and Lipitor. Her most recent LDL was 125. She would like to hold off on starting a cholesterol pill at this time, and we will attempt diet, exercise, and smoking cessation, which I have stressed the importance to reduce overall cv risk.\par I will call her with the results of these tests and arrange follow up.

## 2019-05-20 NOTE — HISTORY OF PRESENT ILLNESS
[FreeTextEntry1] : Pippa is a 64 year old female here for evaluation.\par \par She presented to to the hospital on 5/5/2019 with an episode of palpitations, and chest pounding after eating Chinese food. She was found to have a mild troponin leak, was treated for NSTEMI, and eventually transferred for cardiac catheterization. Her cardiac catheterization was notable for a 30% RCA lesion, but no obstructive coronary disease.\par \par She is here today for followup. She continues to smoke 7 cigarettes per day, though this has decreased in number. She denies significant chest pain, though reports 2 episodes of chest pounding and palpitations since the episode. These usually resolve within seconds to minutes. She is currently not on any medications. Her LDL in the hospital was 125.\par \par She has no lower extremity swelling, orthopnea, PND, dizziness, lightheadedness, and near syncope. She denies significant shortness of breath.

## 2019-05-20 NOTE — PHYSICAL EXAM
[General Appearance - Well Developed] : well developed [Normal Appearance] : normal appearance [Well Groomed] : well groomed [General Appearance - Well Nourished] : well nourished [No Deformities] : no deformities [General Appearance - In No Acute Distress] : no acute distress [Normal Conjunctiva] : the conjunctiva exhibited no abnormalities [Eyelids - No Xanthelasma] : the eyelids demonstrated no xanthelasmas [Normal Oral Mucosa] : normal oral mucosa [No Oral Pallor] : no oral pallor [No Oral Cyanosis] : no oral cyanosis [Normal Jugular Venous A Waves Present] : normal jugular venous A waves present [Normal Jugular Venous V Waves Present] : normal jugular venous V waves present [No Jugular Venous Argueta A Waves] : no jugular venous argueta A waves [Respiration, Rhythm And Depth] : normal respiratory rhythm and effort [Exaggerated Use Of Accessory Muscles For Inspiration] : no accessory muscle use [Auscultation Breath Sounds / Voice Sounds] : lungs were clear to auscultation bilaterally [Normal Rate] : normal [Normal S1] : normal S1 [Normal S2] : normal S2 [S3] : no S3 [S4] : no S4 [No Murmur] : no murmurs heard [Right Carotid Bruit] : no bruit heard over the right carotid [Left Carotid Bruit] : no bruit heard over the left carotid [Right Femoral Bruit] : no bruit heard over the right femoral artery [Left Femoral Bruit] : no bruit heard over the left femoral artery [2+] : left 2+ [No Abnormalities] : the abdominal aorta was not enlarged and no bruit was heard [No Pitting Edema] : no pitting edema present [Abdomen Soft] : soft [Abdomen Tenderness] : non-tender [Abdomen Mass (___ Cm)] : no abdominal mass palpated [Abnormal Walk] : normal gait [Gait - Sufficient For Exercise Testing] : the gait was sufficient for exercise testing [Nail Clubbing] : no clubbing of the fingernails [Cyanosis, Localized] : no localized cyanosis [Petechial Hemorrhages (___cm)] : no petechial hemorrhages [Skin Color & Pigmentation] : normal skin color and pigmentation [] : no rash [No Venous Stasis] : no venous stasis [Skin Lesions] : no skin lesions [No Skin Ulcers] : no skin ulcer [No Xanthoma] : no  xanthoma was observed [Oriented To Time, Place, And Person] : oriented to person, place, and time [Affect] : the affect was normal [Mood] : the mood was normal [No Anxiety] : not feeling anxious

## 2019-05-31 ENCOUNTER — APPOINTMENT (OUTPATIENT)
Dept: CARDIOLOGY | Facility: CLINIC | Age: 64
End: 2019-05-31
Payer: COMMERCIAL

## 2019-05-31 PROCEDURE — 93268 ECG RECORD/REVIEW: CPT

## 2019-06-06 ENCOUNTER — APPOINTMENT (OUTPATIENT)
Dept: CARDIOLOGY | Facility: CLINIC | Age: 64
End: 2019-06-06

## 2019-06-18 ENCOUNTER — APPOINTMENT (OUTPATIENT)
Dept: CARDIOLOGY | Facility: CLINIC | Age: 64
End: 2019-06-18
Payer: COMMERCIAL

## 2019-06-18 PROCEDURE — 93306 TTE W/DOPPLER COMPLETE: CPT

## 2019-08-21 LAB
ALBUMIN SERPL ELPH-MCNC: 4.6 G/DL
ALP BLD-CCNC: 75 U/L
ALT SERPL-CCNC: 15 U/L
ANION GAP SERPL CALC-SCNC: 11 MMOL/L
AST SERPL-CCNC: 19 U/L
BASOPHILS # BLD AUTO: 0.04 K/UL
BASOPHILS NFR BLD AUTO: 0.6 %
BILIRUB SERPL-MCNC: 0.6 MG/DL
BUN SERPL-MCNC: 13 MG/DL
CALCIUM SERPL-MCNC: 9.5 MG/DL
CHLORIDE SERPL-SCNC: 100 MMOL/L
CHOLEST SERPL-MCNC: 230 MG/DL
CHOLEST/HDLC SERPL: 2.9 RATIO
CO2 SERPL-SCNC: 26 MMOL/L
CREAT SERPL-MCNC: 0.8 MG/DL
EOSINOPHIL # BLD AUTO: 0.16 K/UL
EOSINOPHIL NFR BLD AUTO: 2.5 %
ESTIMATED AVERAGE GLUCOSE: 111 MG/DL
GLUCOSE SERPL-MCNC: 86 MG/DL
HBA1C MFR BLD HPLC: 5.5 %
HCT VFR BLD CALC: 38.3 %
HDLC SERPL-MCNC: 79 MG/DL
HGB BLD-MCNC: 12.9 G/DL
IMM GRANULOCYTES NFR BLD AUTO: 0.2 %
LDLC SERPL CALC-MCNC: 131 MG/DL
LYMPHOCYTES # BLD AUTO: 2.15 K/UL
LYMPHOCYTES NFR BLD AUTO: 33.8 %
MAN DIFF?: NORMAL
MCHC RBC-ENTMCNC: 33.2 PG
MCHC RBC-ENTMCNC: 33.7 GM/DL
MCV RBC AUTO: 98.7 FL
MONOCYTES # BLD AUTO: 0.46 K/UL
MONOCYTES NFR BLD AUTO: 7.2 %
NEUTROPHILS # BLD AUTO: 3.54 K/UL
NEUTROPHILS NFR BLD AUTO: 55.7 %
PLATELET # BLD AUTO: 234 K/UL
POTASSIUM SERPL-SCNC: 4.7 MMOL/L
PROT SERPL-MCNC: 7 G/DL
RBC # BLD: 3.88 M/UL
RBC # FLD: 12.5 %
SODIUM SERPL-SCNC: 137 MMOL/L
TRIGL SERPL-MCNC: 102 MG/DL
WBC # FLD AUTO: 6.36 K/UL

## 2019-12-03 ENCOUNTER — NON-APPOINTMENT (OUTPATIENT)
Age: 64
End: 2019-12-03

## 2019-12-03 ENCOUNTER — APPOINTMENT (OUTPATIENT)
Dept: CARDIOLOGY | Facility: CLINIC | Age: 64
End: 2019-12-03
Payer: COMMERCIAL

## 2019-12-03 VITALS
OXYGEN SATURATION: 99 % | HEIGHT: 64 IN | HEART RATE: 74 BPM | DIASTOLIC BLOOD PRESSURE: 71 MMHG | BODY MASS INDEX: 22.71 KG/M2 | SYSTOLIC BLOOD PRESSURE: 147 MMHG | WEIGHT: 133 LBS

## 2019-12-03 VITALS — SYSTOLIC BLOOD PRESSURE: 118 MMHG | DIASTOLIC BLOOD PRESSURE: 72 MMHG

## 2019-12-03 DIAGNOSIS — R07.9 CHEST PAIN, UNSPECIFIED: ICD-10-CM

## 2019-12-03 DIAGNOSIS — R09.89 OTHER SPECIFIED SYMPTOMS AND SIGNS INVOLVING THE CIRCULATORY AND RESPIRATORY SYSTEMS: ICD-10-CM

## 2019-12-03 DIAGNOSIS — R00.2 PALPITATIONS: ICD-10-CM

## 2019-12-03 PROCEDURE — 99214 OFFICE O/P EST MOD 30 MIN: CPT

## 2019-12-03 PROCEDURE — 93000 ELECTROCARDIOGRAM COMPLETE: CPT

## 2019-12-03 NOTE — HISTORY OF PRESENT ILLNESS
[FreeTextEntry1] : Pippa is a 64 year old female here for evaluation.\par \par She presented to to the hospital on 5/5/2019 with an episode of palpitations, and chest pounding after eating Chinese food. She was found to have a mild troponin leak, was treated for NSTEMI, and eventually transferred for cardiac catheterization. Her cardiac catheterization was notable for a 30% RCA lesion, but no obstructive coronary disease.\par \par I last saw her 5/20/2019.\par She is here today for followup. She is feeling well overall. She is now smoking only a few cigarettes/day. She denies significant chest pain and significant palpitations. She is currently not on any medications other than asa. Her LDL was 131. She has been hesitant to start a statin.\par Echo and event monitor were unremarkable earlier this year.\par \par She has no lower extremity swelling, orthopnea, PND, dizziness, lightheadedness, and near syncope. She denies significant shortness of breath.

## 2019-12-03 NOTE — DISCUSSION/SUMMARY
[With Me] : with me [FreeTextEntry1] : Pippa is a 64 year old female here for evaluation. She has nonobstructive CAD.\par \par Her blood pressure is at goal. Her physical exam is unremarkable, other than a left carotid bruit. Her EKG demonstrates a sinus rhythm without obvious ischemia or chamber enlargement. Her LV function is normal\par \par I recommended that she continue aspirin 81 mg p.o. daily. Her most recent LDL was 131. She would like to hold off on starting a cholesterol pill at this time, and we have tried diet,  exercise, and smoking cessation, which I have stressed the importance to reduce overall cv risk. She will have a repeat fasting lipid panel this month. She will also have a carotid doppler to evaluate her left sided bruit.\par \par I will call her with the results of these tests and arrange follow up.

## 2019-12-03 NOTE — PHYSICAL EXAM
[Well Groomed] : well groomed [Normal Appearance] : normal appearance [General Appearance - Well Developed] : well developed [General Appearance - Well Nourished] : well nourished [Normal Conjunctiva] : the conjunctiva exhibited no abnormalities [General Appearance - In No Acute Distress] : no acute distress [No Deformities] : no deformities [Eyelids - No Xanthelasma] : the eyelids demonstrated no xanthelasmas [No Oral Pallor] : no oral pallor [No Oral Cyanosis] : no oral cyanosis [Normal Oral Mucosa] : normal oral mucosa [Normal Jugular Venous A Waves Present] : normal jugular venous A waves present [Normal Jugular Venous V Waves Present] : normal jugular venous V waves present [No Jugular Venous Argueta A Waves] : no jugular venous argueta A waves [Abdomen Soft] : soft [Abdomen Tenderness] : non-tender [Abdomen Mass (___ Cm)] : no abdominal mass palpated [Gait - Sufficient For Exercise Testing] : the gait was sufficient for exercise testing [Abnormal Walk] : normal gait [Cyanosis, Localized] : no localized cyanosis [Petechial Hemorrhages (___cm)] : no petechial hemorrhages [Nail Clubbing] : no clubbing of the fingernails [Skin Color & Pigmentation] : normal skin color and pigmentation [Skin Lesions] : no skin lesions [No Skin Ulcers] : no skin ulcer [No Venous Stasis] : no venous stasis [Affect] : the affect was normal [Oriented To Time, Place, And Person] : oriented to person, place, and time [No Xanthoma] : no  xanthoma was observed [No Anxiety] : not feeling anxious [] : no respiratory distress [Mood] : the mood was normal [Exaggerated Use Of Accessory Muscles For Inspiration] : no accessory muscle use [Auscultation Breath Sounds / Voice Sounds] : lungs were clear to auscultation bilaterally [Respiration, Rhythm And Depth] : normal respiratory rhythm and effort [Normal Rate] : normal [Normal S1] : normal S1 [Normal S2] : normal S2 [S4] : no S4 [S3] : no S3 [No Murmur] : no murmurs heard [Right Carotid Bruit] : no bruit heard over the right carotid [Left Carotid Bruit] : left carotid bruit heard [Left Femoral Bruit] : no bruit heard over the left femoral artery [Right Femoral Bruit] : no bruit heard over the right femoral artery [No Pitting Edema] : no pitting edema present [2+] : left 2+ [No Abnormalities] : the abdominal aorta was not enlarged and no bruit was heard

## 2019-12-12 ENCOUNTER — APPOINTMENT (OUTPATIENT)
Dept: CARDIOLOGY | Facility: CLINIC | Age: 64
End: 2019-12-12
Payer: COMMERCIAL

## 2019-12-12 PROCEDURE — 93880 EXTRACRANIAL BILAT STUDY: CPT

## 2019-12-16 LAB
CHOLEST SERPL-MCNC: 225 MG/DL
CHOLEST/HDLC SERPL: 3 RATIO
HDLC SERPL-MCNC: 75 MG/DL
LDLC SERPL CALC-MCNC: 124 MG/DL
TRIGL SERPL-MCNC: 130 MG/DL

## 2020-07-30 NOTE — ED PROVIDER NOTE - NS_EDPROVIDERDISPOUSERTYPE_ED_A_ED
Pt got poison ivy 4 weeks ago and it went away but she still has itching and welts in the area where the poison was and the itching gets worse at night. Attending Attestation (For Attendings USE Only)...

## 2020-10-07 ENCOUNTER — INPATIENT (INPATIENT)
Facility: HOSPITAL | Age: 65
LOS: 1 days | Discharge: HOME | DRG: 690 | End: 2020-10-09
Attending: INTERNAL MEDICINE | Admitting: INTERNAL MEDICINE
Payer: COMMERCIAL

## 2020-10-07 VITALS
HEART RATE: 96 BPM | HEIGHT: 64 IN | OXYGEN SATURATION: 98 % | WEIGHT: 128.09 LBS | TEMPERATURE: 99 F | RESPIRATION RATE: 16 BRPM | SYSTOLIC BLOOD PRESSURE: 117 MMHG | DIASTOLIC BLOOD PRESSURE: 65 MMHG

## 2020-10-07 DIAGNOSIS — E87.1 HYPO-OSMOLALITY AND HYPONATREMIA: ICD-10-CM

## 2020-10-07 DIAGNOSIS — N39.0 URINARY TRACT INFECTION, SITE NOT SPECIFIED: ICD-10-CM

## 2020-10-07 DIAGNOSIS — R19.7 DIARRHEA, UNSPECIFIED: ICD-10-CM

## 2020-10-07 DIAGNOSIS — R31.9 HEMATURIA, UNSPECIFIED: ICD-10-CM

## 2020-10-07 DIAGNOSIS — I65.29 OCCLUSION AND STENOSIS OF UNSPECIFIED CAROTID ARTERY: ICD-10-CM

## 2020-10-07 DIAGNOSIS — Z29.9 ENCOUNTER FOR PROPHYLACTIC MEASURES, UNSPECIFIED: ICD-10-CM

## 2020-10-07 DIAGNOSIS — Z72.0 TOBACCO USE: ICD-10-CM

## 2020-10-07 LAB
ALBUMIN SERPL ELPH-MCNC: 3.3 G/DL — SIGNIFICANT CHANGE UP (ref 3.3–5)
ALP SERPL-CCNC: 95 U/L — SIGNIFICANT CHANGE UP (ref 40–120)
ALT FLD-CCNC: 31 U/L — SIGNIFICANT CHANGE UP (ref 12–78)
ANION GAP SERPL CALC-SCNC: 8 MMOL/L — SIGNIFICANT CHANGE UP (ref 5–17)
ANION GAP SERPL CALC-SCNC: 9 MMOL/L — SIGNIFICANT CHANGE UP (ref 5–17)
APPEARANCE UR: ABNORMAL
APTT BLD: 28.4 SEC — SIGNIFICANT CHANGE UP (ref 27.5–35.5)
AST SERPL-CCNC: 24 U/L — SIGNIFICANT CHANGE UP (ref 15–37)
BASOPHILS # BLD AUTO: 0.03 K/UL — SIGNIFICANT CHANGE UP (ref 0–0.2)
BASOPHILS NFR BLD AUTO: 0.3 % — SIGNIFICANT CHANGE UP (ref 0–2)
BILIRUB SERPL-MCNC: 0.5 MG/DL — SIGNIFICANT CHANGE UP (ref 0.2–1.2)
BILIRUB UR-MCNC: NEGATIVE — SIGNIFICANT CHANGE UP
BUN SERPL-MCNC: 16 MG/DL — SIGNIFICANT CHANGE UP (ref 7–23)
BUN SERPL-MCNC: 18 MG/DL — SIGNIFICANT CHANGE UP (ref 7–23)
CALCIUM SERPL-MCNC: 8.2 MG/DL — LOW (ref 8.5–10.1)
CALCIUM SERPL-MCNC: 8.6 MG/DL — SIGNIFICANT CHANGE UP (ref 8.5–10.1)
CHLORIDE SERPL-SCNC: 93 MMOL/L — LOW (ref 96–108)
CHLORIDE SERPL-SCNC: 99 MMOL/L — SIGNIFICANT CHANGE UP (ref 96–108)
CO2 SERPL-SCNC: 24 MMOL/L — SIGNIFICANT CHANGE UP (ref 22–31)
CO2 SERPL-SCNC: 24 MMOL/L — SIGNIFICANT CHANGE UP (ref 22–31)
COLOR SPEC: YELLOW — SIGNIFICANT CHANGE UP
CREAT SERPL-MCNC: 0.81 MG/DL — SIGNIFICANT CHANGE UP (ref 0.5–1.3)
CREAT SERPL-MCNC: 0.96 MG/DL — SIGNIFICANT CHANGE UP (ref 0.5–1.3)
DIFF PNL FLD: ABNORMAL
EOSINOPHIL # BLD AUTO: 0.01 K/UL — SIGNIFICANT CHANGE UP (ref 0–0.5)
EOSINOPHIL NFR BLD AUTO: 0.1 % — SIGNIFICANT CHANGE UP (ref 0–6)
GLUCOSE SERPL-MCNC: 111 MG/DL — HIGH (ref 70–99)
GLUCOSE SERPL-MCNC: 99 MG/DL — SIGNIFICANT CHANGE UP (ref 70–99)
GLUCOSE UR QL: NEGATIVE — SIGNIFICANT CHANGE UP
HCT VFR BLD CALC: 31.7 % — LOW (ref 34.5–45)
HGB BLD-MCNC: 11.7 G/DL — SIGNIFICANT CHANGE UP (ref 11.5–15.5)
IMM GRANULOCYTES NFR BLD AUTO: 0.5 % — SIGNIFICANT CHANGE UP (ref 0–1.5)
INR BLD: 1.21 RATIO — HIGH (ref 0.88–1.16)
KETONES UR-MCNC: ABNORMAL
LACTATE SERPL-SCNC: 1.3 MMOL/L — SIGNIFICANT CHANGE UP (ref 0.7–2)
LEUKOCYTE ESTERASE UR-ACNC: ABNORMAL
LYMPHOCYTES # BLD AUTO: 0.76 K/UL — LOW (ref 1–3.3)
LYMPHOCYTES # BLD AUTO: 6.6 % — LOW (ref 13–44)
MCHC RBC-ENTMCNC: 33.8 PG — SIGNIFICANT CHANGE UP (ref 27–34)
MCHC RBC-ENTMCNC: 36.9 GM/DL — HIGH (ref 32–36)
MCV RBC AUTO: 91.6 FL — SIGNIFICANT CHANGE UP (ref 80–100)
MONOCYTES # BLD AUTO: 1.06 K/UL — HIGH (ref 0–0.9)
MONOCYTES NFR BLD AUTO: 9.2 % — SIGNIFICANT CHANGE UP (ref 2–14)
NEUTROPHILS # BLD AUTO: 9.54 K/UL — HIGH (ref 1.8–7.4)
NEUTROPHILS NFR BLD AUTO: 83.3 % — HIGH (ref 43–77)
NITRITE UR-MCNC: POSITIVE
NRBC # BLD: 0 /100 WBCS — SIGNIFICANT CHANGE UP (ref 0–0)
PH UR: 6 — SIGNIFICANT CHANGE UP (ref 5–8)
PLATELET # BLD AUTO: 223 K/UL — SIGNIFICANT CHANGE UP (ref 150–400)
POTASSIUM SERPL-MCNC: 3.7 MMOL/L — SIGNIFICANT CHANGE UP (ref 3.5–5.3)
POTASSIUM SERPL-MCNC: 3.7 MMOL/L — SIGNIFICANT CHANGE UP (ref 3.5–5.3)
POTASSIUM SERPL-SCNC: 3.7 MMOL/L — SIGNIFICANT CHANGE UP (ref 3.5–5.3)
POTASSIUM SERPL-SCNC: 3.7 MMOL/L — SIGNIFICANT CHANGE UP (ref 3.5–5.3)
PROT SERPL-MCNC: 7.6 G/DL — SIGNIFICANT CHANGE UP (ref 6–8.3)
PROT UR-MCNC: 100
PROTHROM AB SERPL-ACNC: 14 SEC — HIGH (ref 10.6–13.6)
RBC # BLD: 3.46 M/UL — LOW (ref 3.8–5.2)
RBC # FLD: 11.9 % — SIGNIFICANT CHANGE UP (ref 10.3–14.5)
SARS-COV-2 RNA SPEC QL NAA+PROBE: SIGNIFICANT CHANGE UP
SODIUM SERPL-SCNC: 126 MMOL/L — LOW (ref 135–145)
SODIUM SERPL-SCNC: 131 MMOL/L — LOW (ref 135–145)
SP GR SPEC: 1.01 — SIGNIFICANT CHANGE UP (ref 1.01–1.02)
UROBILINOGEN FLD QL: 1
WBC # BLD: 11.46 K/UL — HIGH (ref 3.8–10.5)
WBC # FLD AUTO: 11.46 K/UL — HIGH (ref 3.8–10.5)

## 2020-10-07 PROCEDURE — 99285 EMERGENCY DEPT VISIT HI MDM: CPT

## 2020-10-07 PROCEDURE — 93010 ELECTROCARDIOGRAM REPORT: CPT

## 2020-10-07 PROCEDURE — 71045 X-RAY EXAM CHEST 1 VIEW: CPT | Mod: 26

## 2020-10-07 RX ORDER — ASPIRIN/CALCIUM CARB/MAGNESIUM 324 MG
1 TABLET ORAL
Qty: 0 | Refills: 0 | DISCHARGE

## 2020-10-07 RX ORDER — ASCORBIC ACID 60 MG
1 TABLET,CHEWABLE ORAL
Qty: 0 | Refills: 0 | DISCHARGE

## 2020-10-07 RX ORDER — VITAMIN E 100 UNIT
1 CAPSULE ORAL
Qty: 0 | Refills: 0 | DISCHARGE

## 2020-10-07 RX ORDER — SACCHAROMYCES BOULARDII 250 MG
250 POWDER IN PACKET (EA) ORAL
Refills: 0 | Status: DISCONTINUED | OUTPATIENT
Start: 2020-10-07 | End: 2020-10-09

## 2020-10-07 RX ORDER — ONDANSETRON 8 MG/1
4 TABLET, FILM COATED ORAL ONCE
Refills: 0 | Status: COMPLETED | OUTPATIENT
Start: 2020-10-07 | End: 2020-10-07

## 2020-10-07 RX ORDER — ATORVASTATIN CALCIUM 80 MG/1
1 TABLET, FILM COATED ORAL
Qty: 0 | Refills: 0 | DISCHARGE

## 2020-10-07 RX ORDER — ASPIRIN/CALCIUM CARB/MAGNESIUM 324 MG
81 TABLET ORAL DAILY
Refills: 0 | Status: DISCONTINUED | OUTPATIENT
Start: 2020-10-07 | End: 2020-10-09

## 2020-10-07 RX ORDER — NICOTINE POLACRILEX 2 MG
1 GUM BUCCAL DAILY
Refills: 0 | Status: DISCONTINUED | OUTPATIENT
Start: 2020-10-07 | End: 2020-10-09

## 2020-10-07 RX ORDER — GLUCOSAMINE/CHONDROITIN/C/MANG 500-400 MG
1 CAPSULE ORAL
Qty: 0 | Refills: 0 | DISCHARGE

## 2020-10-07 RX ORDER — CYCLOSPORINE 0.5 MG/ML
1 EMULSION OPHTHALMIC
Qty: 0 | Refills: 0 | DISCHARGE

## 2020-10-07 RX ORDER — INFLUENZA VIRUS VACCINE 15; 15; 15; 15 UG/.5ML; UG/.5ML; UG/.5ML; UG/.5ML
0.5 SUSPENSION INTRAMUSCULAR ONCE
Refills: 0 | Status: DISCONTINUED | OUTPATIENT
Start: 2020-10-07 | End: 2020-10-09

## 2020-10-07 RX ORDER — ENOXAPARIN SODIUM 100 MG/ML
40 INJECTION SUBCUTANEOUS DAILY
Refills: 0 | Status: DISCONTINUED | OUTPATIENT
Start: 2020-10-07 | End: 2020-10-09

## 2020-10-07 RX ORDER — ONDANSETRON 8 MG/1
4 TABLET, FILM COATED ORAL EVERY 6 HOURS
Refills: 0 | Status: DISCONTINUED | OUTPATIENT
Start: 2020-10-07 | End: 2020-10-09

## 2020-10-07 RX ORDER — ATORVASTATIN CALCIUM 80 MG/1
20 TABLET, FILM COATED ORAL AT BEDTIME
Refills: 0 | Status: DISCONTINUED | OUTPATIENT
Start: 2020-10-07 | End: 2020-10-09

## 2020-10-07 RX ORDER — LACTOBACILLUS ACIDOPHILUS 100MM CELL
1 CAPSULE ORAL
Refills: 0 | Status: DISCONTINUED | OUTPATIENT
Start: 2020-10-07 | End: 2020-10-07

## 2020-10-07 RX ORDER — CEFTRIAXONE 500 MG/1
1000 INJECTION, POWDER, FOR SOLUTION INTRAMUSCULAR; INTRAVENOUS EVERY 24 HOURS
Refills: 0 | Status: DISCONTINUED | OUTPATIENT
Start: 2020-10-07 | End: 2020-10-09

## 2020-10-07 RX ORDER — CEFTRIAXONE 500 MG/1
1000 INJECTION, POWDER, FOR SOLUTION INTRAMUSCULAR; INTRAVENOUS ONCE
Refills: 0 | Status: COMPLETED | OUTPATIENT
Start: 2020-10-07 | End: 2020-10-07

## 2020-10-07 RX ORDER — SODIUM CHLORIDE 9 MG/ML
1700 INJECTION INTRAMUSCULAR; INTRAVENOUS; SUBCUTANEOUS ONCE
Refills: 0 | Status: COMPLETED | OUTPATIENT
Start: 2020-10-07 | End: 2020-10-07

## 2020-10-07 RX ADMIN — CEFTRIAXONE 100 MILLIGRAM(S): 500 INJECTION, POWDER, FOR SOLUTION INTRAMUSCULAR; INTRAVENOUS at 12:19

## 2020-10-07 RX ADMIN — ONDANSETRON 4 MILLIGRAM(S): 8 TABLET, FILM COATED ORAL at 12:20

## 2020-10-07 RX ADMIN — SODIUM CHLORIDE 1700 MILLILITER(S): 9 INJECTION INTRAMUSCULAR; INTRAVENOUS; SUBCUTANEOUS at 10:30

## 2020-10-07 NOTE — ED ADULT NURSE NOTE - OBJECTIVE STATEMENT
patient comes to ED for evaluation of fever with UTI for 4 days reports hx of recurrent UTI on multiple antibiotics without relief also c/o nausea diarrhea vomited bile once and is having one liquid bowel movement daily takes probiotic with her antibiotics as she has had c-diff in past also reports having loss of appetite but taking po fluids

## 2020-10-07 NOTE — ED PROVIDER NOTE - PROGRESS NOTE DETAILS
dr kamara consulted advised no need for admission from gu standpoint. pt hyponatremic so needs admission and failed out pt abx tx. dr kamara consulted advised no need for admission from gu standpoint. from ed standpoint pt hyponatremic  and failed out pt abx tx so admission is indicated at this time. dr jaimes will admit.

## 2020-10-07 NOTE — H&P ADULT - PROBLEM SELECTOR PLAN 2
Pt with history of recurrent UTI failed outpatient with multiple antibiotics( Nitrofurantoin, Bactrim, Ciprofloxacin)   -Afebrile, leukocytosis 11k   -UA shows +nitrate, +LE, +blood.   -s/p Rocephin 1 g in the ED   -Blood and urine culture f/up results  -Ordered Bladder and Renal US, f/up results.   -ID consulted, F/up recs. Pt with history of recurrent UTI failed outpatient with multiple antibiotics( Nitrofurantoin, Bactrim, Ciprofloxacin)   -Afebrile, leukocytosis 11k   -UA shows +nitrate, +LE, +blood.   -s/p Rocephin 1 g in the ED , continue rocephin  -Blood and urine culture f/up results  -Ordered Bladder and Renal US, f/up results.   -ID consulted, F/up recs. Acute  -On admission Na 126 likely 2/2 to dehydration (diarrhea, vomiting and decreased PO intake ~3-4 days)  - s/p IVF 1.7 NS bolus ED   - avoid correction of Na > 8 mEq/24 hours  - stat repeat BMP Na 131, will hold off on additional fluids  - encourage PO intake   - fu AM BMP

## 2020-10-07 NOTE — H&P ADULT - PROBLEM SELECTOR PLAN 5
Presents with hematuria.   - SCDs for now   IMPROVE VTE Individual Risk Assessment          RISK                                                          Points  [  ] Previous VTE                                                3  [  ] Thrombophilia                                             2  [  ] Lower limb paralysis                                   2        (unable to hold up >15 seconds)    [  ] Current Cancer                                             2         (within 6 months)  [  ] Immobilization > 24 hrs                              1  [  ] ICU/CCU stay > 24 hours                             1  [ x ] Age > 60                                                         1    IMPROVE VTE Score:         [     1    ] DVT prophylaxis: SCDs for now in setting of hematuria, hold chemical dvt prophylaxis  IMPROVE VTE Individual Risk Assessment          RISK                                                          Points  [  ] Previous VTE                                                3  [  ] Thrombophilia                                             2  [  ] Lower limb paralysis                                   2        (unable to hold up >15 seconds)    [  ] Current Cancer                                             2         (within 6 months)  [  ] Immobilization > 24 hrs                              1  [  ] ICU/CCU stay > 24 hours                             1  [ x ] Age > 60                                                         1    IMPROVE VTE Score:         [     1    ] UA large amount of blood   - likely 2/2 to failed outpatient UTI  - fu Kidney and bladder ultrasound  - hemodynamically stable, continue to monitor H&H  - consult urology, Dr. Kang H/o carotid stenosis  On Statin & ASA  -pt needs to STOP Smoking

## 2020-10-07 NOTE — H&P ADULT - NSICDXPASTSURGICALHX_GEN_ALL_CORE_FT
PAST SURGICAL HISTORY:  No significant past surgical history      No significant past surgical history

## 2020-10-07 NOTE — H&P ADULT - GASTROINTESTINAL DETAILS
no guarding/no rebound tenderness/no distention/normal/nontender/no bruit no distention/no guarding/no organomegaly/bowel sounds normal/nontender/no bruit/no rebound tenderness/normal/no masses palpable

## 2020-10-07 NOTE — H&P ADULT - NSHPPHYSICALEXAM_GEN_ALL_CORE
General: Well nourished/Well developed, NAD  Head:  Normocephalic, atraumatic  ENT:  Mucosa moist, no ulcerations  Neck:  Supple, no sinuses or palpable masses  Lymphatic:  No palpable cervical, supraclavicular, axillary or inguinal adenopathy  Respiratory: CTA B/L  CV: RRR, S1S2, no murmur  Abdominal: Soft, NT, ND no palpable mass  Extremities: No edema, + peripheral pulses, FROM all 4 extremity  Neurology: A&Ox3, no focalization signs General: Well nourished/Well developed, NAD  Head:  Normocephalic, atraumatic  ENT:  Mucosa moist, no ulcerations  Neck:  Supple, no sinuses or palpable masses  Lymphatic:  No palpable cervical, supraclavicular, axillary or inguinal adenopathy  Respiratory: CTA B/L  CV: RRR, S1S2, no murmur  Abdominal: Soft, NT, ND no palpable mass, +decreased bowel sounds, tense on palpation  : +CVA tenderness on L side  Extremities: No edema, + peripheral pulses, FROM all 4 extremity  Neurology: A&Ox3, no focalization signs General: Well nourished/Well developed, NAD  Head:  Normocephalic, atraumatic  ENT:  dry mucous membranes, no ulcerations  Neck:  Supple, no sinuses or palpable masses  Lymphatic:  No palpable cervical, supraclavicular  Respiratory: CTA B/L  CV: tachycardic, S1S2, no murmur  Abdominal: Soft, NT, ND, tense abdomen, no palpable mass, +decreased bowel sounds,   : No CVA tenderness, no suprapubic tenderness    Extremities: No edema, + peripheral pulses, FROM all 4 extremity  Neurology: A&Ox3, no focalization signs General: Well nourished/Well developed, NAD  Head:  Normocephalic, atraumatic  ENT:  dry mucous membranes, no ulcerations  Neck:  Supple, no sinuses or palpable masses  Lymphatic:  No palpable cervical, supraclavicular  Respiratory: CTA B/L  CV: tachycardic, S1S2, no murmur  Abdominal: Soft, NT, ND, tense abdomen, no palpable mass, +decreased bowel sounds,   : No CVA tenderness, no suprapubic tenderness    Extremities: No edema, 2+ peripheral pulses, FROM all 4 extremity  Neurology: A&Ox3, no focalization signs

## 2020-10-07 NOTE — H&P ADULT - PROBLEM SELECTOR PLAN 7
DVT prophylaxis: SCDs for now in setting of hematuria, hold chemical dvt prophylaxis  IMPROVE VTE Individual Risk Assessment          RISK                                                          Points  [  ] Previous VTE                                                3  [  ] Thrombophilia                                             2  [  ] Lower limb paralysis                                   2        (unable to hold up >15 seconds)    [  ] Current Cancer                                             2         (within 6 months)  [  ] Immobilization > 24 hrs                              1  [  ] ICU/CCU stay > 24 hours                             1  [ x ] Age > 60                                                         1    IMPROVE VTE Score:         [     1    ]

## 2020-10-07 NOTE — H&P ADULT - PROBLEM SELECTOR PLAN 1
On admission Na 126 Acute  -On admission Na 126 likely 2/2 to diarrhea  -s/p IVF 1.7 Lt  - Acute  -On admission Na 126 likely 2/2 to dehydration (diarrhea, vomiting and decreased PO intake ~3-4 days)  - s/p IVF 1.7 NS bolus ED   - cw IVF  cc/hour avoid correction of Na > 8 mEq/24 hours  - stat repeat BMP Na   - fu AM BMP Acute  -On admission Na 126 likely 2/2 to dehydration (diarrhea, vomiting and decreased PO intake ~3-4 days)  - s/p IVF 1.7 NS bolus ED   - avoid correction of Na > 8 mEq/24 hours  - stat repeat BMP Na 131  - fu AM BMP Acute  -On admission Na 126 likely 2/2 to dehydration (diarrhea, vomiting and decreased PO intake ~3-4 days)  - s/p IVF 1.7 NS bolus ED   - avoid correction of Na > 8 mEq/24 hours  - stat repeat BMP Na 131, will hold off on additional fluids  - fu AM BMP history of C diff (~8 yr ago); 2x episodes of foul smelling diarrhea  - likely 2/2 to antibiotic use   - C diff ordered will follow up  - O/P stool ordered will follow up   - florastor probiotics Acute  -On admission Na 126 likely 2/2 to dehydration (diarrhea, vomiting and decreased PO intake ~3-4 days)  - s/p IVF 1.7 NS bolus ED   - avoid correction of Na > 8 mEq/24 hours  - stat repeat BMP Na 131, will hold off on additional fluids  - encourage PO intake   - fu AM BMP

## 2020-10-07 NOTE — H&P ADULT - PROBLEM SELECTOR PLAN 3
- 50 pack/year smoking history  - nicotine patch - 50 pack/year smoking history, current smoker of 3-4 cigarettes a day  - patient counseled on the risks of smoking including heart disease and cancer, offered nicotine patch to assist with smoking cessation and patient accepted Pt with history of recurrent UTI failed outpatient with multiple antibiotics( Nitrofurantoin, Bactrim, Ciprofloxacin)   -Afebrile, leukocytosis 11k   -UA shows +nitrate, +LE, +blood.   -s/p Rocephin 1 g in the ED , continue rocephin  -Blood and urine culture f/up results  -Ordered Bladder and Renal US, f/up results.   -ID consulted, F/up recs. history of C diff (~8 yr ago); 2x episodes of foul smelling diarrhea  - likely 2/2 to antibiotic use   - C diff ordered will follow up  - O/P stool ordered will follow up   - florastor probiotics

## 2020-10-07 NOTE — H&P ADULT - PROBLEM SELECTOR PLAN 6
DVT prophylaxis: SCDs for now in setting of hematuria, hold chemical dvt prophylaxis  IMPROVE VTE Individual Risk Assessment          RISK                                                          Points  [  ] Previous VTE                                                3  [  ] Thrombophilia                                             2  [  ] Lower limb paralysis                                   2        (unable to hold up >15 seconds)    [  ] Current Cancer                                             2         (within 6 months)  [  ] Immobilization > 24 hrs                              1  [  ] ICU/CCU stay > 24 hours                             1  [ x ] Age > 60                                                         1    IMPROVE VTE Score:         [     1    ] UA large amount of blood   - likely 2/2 to failed outpatient UTI  - fu Kidney and bladder ultrasound  - hemodynamically stable, continue to monitor H&H  - consult urology, Dr. Kang

## 2020-10-07 NOTE — H&P ADULT - NSICDXPASTMEDICALHX_GEN_ALL_CORE_FT
PAST MEDICAL HISTORY:  Chronic back pain     Current tobacco use     Dry eyes     Hematuria, unspecified type

## 2020-10-07 NOTE — ED PROVIDER NOTE - ATTENDING CONTRIBUTION TO CARE
66 yo F p/w recent UTI over past ~ 6 weeks. Pt took 3 seperate courses of abx, last ceftin and Bactrim. Now wiht diarrhea , gen weakness. On / off fever. Pt with mild chronic congestion, no new congestion. No known covid exposure, no knwon prior infxn. no neck / back pain. no weakness / dizziness. no agg/ allev factors. No other inj or co.  Pt had COVID test yest - no known res yet.  Exam; MM Moist. neck supple. non-toxic, well appearing. Abd non-distended. nl resp effort. no acc muscle use. no c/c/e. No other acute findings.  Check labs, XR, UA, cdiff, IVF, reeval

## 2020-10-07 NOTE — ED PROVIDER NOTE - CROS ED CONS ALL NEG
Patient presents to clinic for lab draw, provider visit, and modified DCF chemotherapy treatment for Malignant neoplasm of lower third of esophagus (CMS/HCC) per Dr. Justin. Patient reports no new concerns at this time. Patient also to receive 4g IV Magnesium today with chemo treatment. Okay to proceed with scheduled treatment today.    WBC (K/mcL)   Date Value   2020 15.2 (H)   2020 15.7 (H)     RBC (mil/mcL)   Date Value   2020 3.97 (L)   2020 4.09 (L)     HCT (%)   Date Value   2020 34.7 (L)   2020 35.7 (L)     HGB (g/dL)   Date Value   2020 11.2 (L)   2020 11.3 (L)     PLT   Date Value   2020 155 K/mcL   2020 164 K/mcL   2012 181 K/uL     Sodium (mmol/L)   Date Value   2020 140   2020 139     Potassium (mmol/L)   Date Value   2020 4.0   2020 4.2     Chloride (mmol/L)   Date Value   2020 103   2020 103     Glucose (mg/dL)   Date Value   2020 122 (H)   2020 132 (H)     CALCIUM (mg/dL)   Date Value   2020 8.7   2012 9.3     Calcium (mg/dL)   Date Value   2020 8.1 (L)     CO2 (mmol/L)   Date Value   2012 27     Carbon Dioxide (mmol/L)   Date Value   2020 27   2020 26     BUN (mg/dL)   Date Value   2020 17   2020 17     Creatinine (mg/dL)   Date Value   2020 0.83   2020 0.82     Diagnosis: Malignant neoplasm of lower third of esophagus (CMS/HCC)    Regimen: DCF  Cycle/Day: C5D1    Dr. Justin is supervising clinician today.    ECO - No physically strenuous activity, but ambulatory and able to carry out light or sedentary work.    Review and verified Advanced Directives: Yes, verified and forms not on file.    Verified if patient has state DNR bracelet on: No; Full Code    Nursing Assessment:   A focused nursing assessment addressing the toxicity of chemotherapy was performed and the patient reports the following:  Nausea: YES, improving  per patient. Taking PRN antiemetics at home.  Vomiting: NO  Fever: NO  Chills: NO  Other signs of infection: NO  Bleeding: NO  Mucositis: NO  Diarrhea: YES, taking imodium. No BM's today yet per patient.  Constipation: NO  Anorexia: NO  Dysuria: NO  Urinary Bleeding: NO  Cough: NO  Shortness of Breath: NO  Fatigue/Weakness: YES, relieved with rest.  Numbness/Tingling: YES, fingers and toes.  Other Neuropathies: NO  Edema: NO  Rash: NO  Hand/Foot Syndrome: NO  Anxiety/Depression/Insomnia: NO  Pain: NO    Patient confirms that he has received a copy of Chemotherapy Consent and verbalizes understanding of treatment plan: Yes.     Pre-Treatment: - Treatment consent signed  - Patient has valid pre-authorization  - VS completed  - Height and weight verified  BSA independently double checked & verified by two practitioners  - Premed orders, including hydration, are verified prior to administration  - Treatment parameters verified in patient protocol  - Chemotherapy doses independently doubled checked & verified by two practitioners  - Chemotherapy infusion pump settings are double checked & verified by two practitioners  - Patient is identified by first & last name, Date of birth that has been verified with the patient chairside.  - Patient is identified by first & last name, Date of birth that has been verified by two practitioners with the patient chairside.    Treatment: Refer to LDA and MAR for line assessment and medication administration  Refer to Toxicity Assessment doc flowsheet   Chemotherapy has not ; double checked & verified by two practitioners  Appearance and physical integrity of drugs meets standard of drug monograph; double checked & verified by two practitioners  Rate set on infusion pump is in alignment with ordered rate; double checked & verified by two practitioners  Blood return confirmed before, during and after treatment administered  Infusion pump used for non-vesicant drugs  Home ambulatory  pump on discharge. 5FU for continuous infusion: Infusion pump provided by [employer], medication supplied and pump connected by [facility].    Post Treatment: Treatment tolerated well; no adverse reaction    Does the Patient have a central line?  yes:   Device: Port  Central Line Site: Right  and Chest  Central Line Site Appearance: WDL  Implanted port accessed using a 20 gauge non-coring needle primed with 0.9% sodium chloride. Good blood return obtained.  Blood obtained and sent to lab.  Site Care: Aseptic site care per protocol, Occlusive Transparent Dressing, Injection caps changed/applied and Biopatch  Comments: port accessed in lab.  Central line flushed with: 10 ml 0.9 normal saline and 100 un/ml- 500 units heparin  Central line removed: no  Díaz Needle: Díaz needle left in place    Transfusion: Not needed    Integrative Medicine: No    Oral Chemotherapy: No    Education: No new instructions needed    Next appointment scheduled:     Future Appointments   Date Time Provider Department Center   2/13/2020  2:30 PM [id number] INFUSION RN [address]   2/18/2020  2:30 PM [id number] ACL LAB ACLSLMGTC [facility]   2/18/2020  2:45 PM [id number] INFUSION RN [address]   2/25/2020  9:45 AM [email or web address] ACL LAB ACLSLMGTC [facility]   2/25/2020 10:00 AM VINAYAK Oneal [address]   2/27/2020 11:30 AM [id number] INFUSION RN [address]     Patient instructed to call the office with any questions or concerns.    Patient Discharged: patient discharged to home per self, ambulatory, with family member.     - - -

## 2020-10-07 NOTE — H&P ADULT - PROBLEM SELECTOR PLAN 4
UA large amount of blood   - likely 2/2 to failed outpatient UTI  - fu Kidney and bladder ultrasound  - consult urology UA large amount of blood   - likely 2/2 to failed outpatient UTI  - fu Kidney and bladder ultrasound  - hemodynamically stable, continue to monitor H&H  - consult urology, Dr. Kang - 50 pack/year smoking history, current smoker of 3-4 cigarettes a day  - patient counseled on the risks of smoking including heart disease and cancer, offered nicotine patch to assist with smoking cessation and patient accepted

## 2020-10-07 NOTE — H&P ADULT - NSICDXFAMILYHX_GEN_ALL_CORE_FT
FAMILY HISTORY:  Mother  Still living? Unknown  FH: CAD (coronary artery disease), Age at diagnosis: Age Unknown     FAMILY HISTORY:  FH: HTN (hypertension), (Mother, Brother)  FH: lung cancer, (Grandfather)    Mother  Still living? Unknown  FH: CAD (coronary artery disease), Age at diagnosis: Age Unknown

## 2020-10-07 NOTE — ED PROVIDER NOTE - OBJECTIVE STATEMENT
pt is a 66yo female with pmhx of hld, c-diff, utis, current smoker presents with diarrhea x days. pt reports for the past 6 weeks she been being treated for a uti, first was on macrobid, then changed to ceftin and most recently changed to bactrim on 9/30 which she stopped on 10/3 due to nausea and gi side effects. pt is a 66yo female with pmhx of hld, c-diff, utis, current smoker presents with diarrhea x days. pt reports for the past 6 weeks she been being treated for a uti by dr kamara, first was on macrobid, then changed to ceftin and most recently changed to bactrim on 9/30 which she stopped on 10/3 due to nausea and gi side effects. pt was then seen at [facility] sunday had covid test unknown results and repeat ua showing a uti. pt was then placed on macrobid. pt reports on 10/4 she developed watery foul smelling diarrhea with 100.7 fever treated with tylenol. pt reports urinary symptoms have since resolved but she now has left flank pain. pt denies cp, sob, cough, abd pain, vomiting.   pmd: none

## 2020-10-07 NOTE — H&P ADULT - NSHPSOCIALHISTORY_GEN_ALL_CORE
Alcohol:  Tobacco:   Drug:  Ambulate: Alcohol: social   Tobacco: few cigarettes a day for 50 years  Drug: N/A  Ambulate: Pt is able to ambulate independently w/o the use of DME  Occupation: Retired (previously worked in sales and finance) Alcohol: socially   Tobacco: 50 pk/years   Drug: N/A  Ambulate: Pt is able to ambulate independently  Occupation: Retired (previously worked in sales and finance)

## 2020-10-07 NOTE — ED PROVIDER NOTE - CLINICAL SUMMARY MEDICAL DECISION MAKING FREE TEXT BOX
pt is a 66yo female with pmhx of hld, c-diff, utis, current smoker presents with diarrhea x days. pt reports for the past 6 weeks she been being treated for a uti by dr kamara, first was on macrobid, then changed to ceftin and most recently changed to bactrim on 9/30 which she stopped on 10/3 due to nausea and gi side effects. pt was then seen at [facility] sunday had covid test unknown results and repeat ua showing a uti. pt was then placed on macrobid. pt reports on 10/4 she developed watery foul smelling diarrhea with 100.7 fever treated with tylenol. pt reports urinary symptoms have since resolved. pt denies cp, sob, cough, abd pain, vomiting. exam without acute findings no cvat. will do labs per sepsis protocol, ua/cx, covid testing, c-diff test, cxr, ekg, ivf, abx, re-eval

## 2020-10-07 NOTE — H&P ADULT - ASSESSMENT
66 yo Female patient with PMH of HLD, C. diff and recurrent UTI. Presents to the ED c/o diarrhea for----------------admitted for hyponatremia and failed outpatient UTI.    66 yo Female patient with PMH of HLD, C. diff and recurrent UTI. Presents to the ED c/o diarrhea for----------------admitted for hyponatremia and failed outpatient UTI.   66 yo Female patient with PMH of HLD, C. diff and recurrent UTI. Presents to the ED c/o diarrhea ---------- admitted for hyponatremia and failed outpatient UTI.   66 yo Female patient with PMH of HLD, active smoker, C. diff (8 years ago) and recurrent UTI. Presents to the ED c/o diarrhea, admitted for hyponatremia and failed outpatient UTI.

## 2020-10-07 NOTE — H&P ADULT - NSHPREVIEWOFSYSTEMS_GEN_ALL_CORE
CONSTITUTIONAL: denies fever, chills, fatigue, weakness  HEENT: denies blurred vision, sore throat  SKIN: denies new lesions, rash  CARDIOVASCULAR: denies chest pain, chest pressure, palpitations  RESPIRATORY: denies shortness of breath, sputum production  GASTROINTESTINAL: denies nausea, vomiting, diarrhea, abdominal pain  GENITOURINARY: denies dysuria, discharge  NEUROLOGICAL: denies numbness, headache, focal weakness  MUSCULOSKELETAL: denies new joint pain, muscle aches  HEMATOLOGIC: denies gross bleeding, bruising  LYMPHATICS: denies enlarged lymph nodes, extremity swelling  PSYCHIATRIC: denies recent changes in anxiety, depression  ENDOCRINOLOGIC: denies sweating, cold or heat intolerance CONSTITUTIONAL: denies fever, chills, fatigue, weakness  HEENT: denies blurred vision, sore throat  SKIN: denies new lesions, rash  CARDIOVASCULAR: denies chest pain, chest pressure, palpitations  RESPIRATORY: denies shortness of breath, sputum production  GASTROINTESTINAL: admits to nausea, vomiting, diarrhea, and lower abdominal cramping  GENITOURINARY: admits to dysuria, pressure, urgency, and 1 episode hematuria (3 weeks ago)  NEUROLOGICAL: denies numbness, headache, focal weakness  MUSCULOSKELETAL: admits to jaw pain due to teeth grinding  HEMATOLOGIC: denies gross bleeding, bruising  LYMPHATICS: denies enlarged lymph nodes, extremity swelling  PSYCHIATRIC: denies recent changes in anxiety, depression, tearful when speaking of recently  friend  ENDOCRINOLOGIC: denies sweating, cold or heat intolerance CONSTITUTIONAL: ADMITs to fever (subjective), no chills, fatigue, weakness  HEENT: denies blurred vision, sore throat  SKIN: denies new lesions, rash  CARDIOVASCULAR: denies chest pain, chest pressure, palpitations  RESPIRATORY: denies shortness of breath, sputum production  GASTROINTESTINAL: ADMITs to nausea, vomiting, diarrhea, and lower abdominal cramping (see HPI)   GENITOURINARY: ADMITs to dysuria, pressure, urgency (see HPI)   NEUROLOGICAL: denies numbness, headache, focal weakness  MUSCULOSKELETAL: ADMITs to jaw pain due to teeth grinding  HEMATOLOGIC: denies gross bleeding, bruising  LYMPHATICS: denies enlarged lymph nodes, extremity swelling  PSYCHIATRIC: denies recent changes in anxiety, depression,   tearful when speaking of recently  friend  ENDOCRINOLOGIC: denies sweating, cold or heat intolerance

## 2020-10-07 NOTE — H&P ADULT - NEUROLOGICAL DETAILS
alert and oriented x 3/sensation intact/normal strength/responds to verbal commands cranial nerves intact/normal strength/alert and oriented x 3/sensation intact/responds to verbal commands

## 2020-10-07 NOTE — H&P ADULT - HISTORY OF PRESENT ILLNESS
69 yo F patient with PMH current smoker, HDL, C. diff. and recurrent UTI. Presents today to the ED c/o diarrhea for several days.  Patient with recurrent UTI, was given Nitrofurantoin (October 5),ciprofloxacin (september 15) and Bactrim (September 30). 69 yo F patient with PMH current smoker, HDL, C. diff. and recurrent UTI. Presents today to the ED c/o diarrhea since Saturday. Pt had a UTI for the last 6 weeks that has been getting worse despite multiple abx therapy. Pt had been on Nitrofurantoin (October 5), ciprofloxacin (september 15) and Bactrim (September 30) and then recently restarted on Nitrofurantoin. Pt only took 2 doses of bactrim due to nausea so had to be started on nitrofurantoin again. Pt has been reporting worsening cramping pain in the lower abdomen. She states that the pain improved when on Nitrofurantoin initially and ciprofloxacin, however the cramping kept returning. When patient was started on macrobid the second time she had 2 episodes of nonbloody foul smelling diarrhea and 1 episode of bilious nonbloody vomiting. This prompted the patient to go to the ED because of her previous hx of C Diff. Pt reports loss of appetite and has not been able to eat or drink much since Monday. Pt also reports of feeling dehydrated even though she has been drinking water. Pt had 1 episode of hematuria (3 weeks ago), as well as a general sensation of pressure, dysuria, and urgency.     Patient with recurrent UTI, was given Nitrofurantoin (October 5),ciprofloxacin (september 15) and Bactrim (September 30).      of note- pt does not have PCP    PMH- Carotid Stenosis  Meds- Lipitor (0.2 mg), baby ASA, multivitamins, fish oil, eye drops (compliant with all medications)  Previous hospitalization- 5/2019 for an episode of palpitations (cause unknown, not admitted)  PSH- not reported    Urologist- Dr. Ramsey 69 yo F patient with PMH current smoker, HLD, C. diff. and recurrent UTI. Presents today to the ED c/o  2x nonbloody foul smelling diarrhea since Saturday. Pt had a UTI for the last 6 weeks that has been getting worse despite multiple abx therapy. Pt had been on Nitrofurantoin (October 5), ciprofloxacin (september 15) and Bactrim (September 30) and then recently restarted on Nitrofurantoin. Pt only took 2 doses of bactrim due to nausea so had to be started on nitrofurantoin again. Pt has been reporting worsening cramping pain in the lower abdomen on and off for about 6 weeks. She states that the pain improved when on Nitrofurantoin initially and cefitin, however the cramping kept returning. When patient was started on macrobid the second time she had 2 episodes of nonbloody foul smelling diarrhea on Saturday and sunday and 1 episode of bilious nonbloody vomiting on Sunday. She also admitted subjective fever on saturday. She states that she doesn't feel well overall. This prompted the patient to go to the ED because of her previous hx of C Diff (~8 years ago). Pt reports loss of appetite and has not been able to eat or drink much since Monday. Pt also reports of feeling dehydrated even though she has been enough drinking water. Pt had 1 episode of hematuria (3 weeks ago), as well as a general sensation of pressure, dysuria, and urgency on and off for the last 6 weeks.     Patient with recurrent UTI, was given Nitrofurantoin (October 5),ciprofloxacin (september 15) and Bactrim (September 30).      of note- pt does not have PCP    In the ED Vitals:   /65 HR 96 T 98.6 F SpO2 98% RA   Labs:   WBC: 11.46 with bands Neutrophil 9.54   H/H: 11.7/31.7  UA: grossly positive for nitrites, mod leuk esterase and large amount of blood, no white cells   CXR: no active pulmonary disease     Given 1.7 L NS bolus X 1  Started on IV Rocephin 1 g      71 yo F patient with PMH current smoker, HLD, C. diff. and recurrent UTI. Presents today to the ED c/o  2x nonbloody foul smelling diarrhea since Saturday. Pt had a UTI for the last 6 weeks that has been getting worse despite multiple abx therapy. Pt had been on Nitrofurantoin (October 5), ciprofloxacin (september 15) and Bactrim (September 30) and then recently restarted on Nitrofurantoin. Pt only took 2 doses of bactrim due to nausea so had to be started on nitrofurantoin again. Pt has been reporting worsening cramping pain in the lower abdomen on and off for about 6 weeks. She states that the pain improved when on Nitrofurantoin initially and cefitin, however the cramping kept returning. When patient was started on macrobid the second time she had 2 episodes of nonbloody foul smelling diarrhea on Saturday and sunday and 1 episode of bilious nonbloody vomiting on Sunday. She also admitted subjective fever X1  on Saturday. She states that she doesn't feel well overall. This prompted the patient to go to the ED because of her previous hx of C Diff (~8 years ago). Pt reports loss of appetite and has not been able to eat or drink much since Monday. Pt also reports of feeling dehydrated even though she has been enough drinking water. Pt had 1 episode of hematuria (3 weeks ago), as well as a general sensation of pressure, dysuria, and urgency on and off for the last 6 weeks.     Patient with recurrent UTI, was given Nitrofurantoin (October 5),ciprofloxacin (september 15) and Bactrim (September 30).      of note- pt does not have PCP    In the ED Vitals:   /65 HR 96 T 98.6 F SpO2 98% RA   Labs:   WBC: 11.46 with bands Neutrophil 9.54   H/H: 11.7/31.7  UA: grossly positive for nitrites, mod leuk esterase and large amount of blood, no white cells   CXR: no active pulmonary disease     Given 1.7 L NS bolus X 1  Started on IV Rocephin 1 g

## 2020-10-07 NOTE — ED ADULT TRIAGE NOTE - CHIEF COMPLAINT QUOTE
Pt reports a UTI & diarrhea x 4days, pt states she thinks she might have COVID was tested yesterday- no results yet

## 2020-10-08 DIAGNOSIS — H04.123 DRY EYE SYNDROME OF BILATERAL LACRIMAL GLANDS: ICD-10-CM

## 2020-10-08 DIAGNOSIS — M54.9 DORSALGIA, UNSPECIFIED: ICD-10-CM

## 2020-10-08 LAB
ALBUMIN SERPL ELPH-MCNC: 3 G/DL — LOW (ref 3.3–5)
ALP SERPL-CCNC: 94 U/L — SIGNIFICANT CHANGE UP (ref 40–120)
ALT FLD-CCNC: 34 U/L — SIGNIFICANT CHANGE UP (ref 12–78)
ANION GAP SERPL CALC-SCNC: 9 MMOL/L — SIGNIFICANT CHANGE UP (ref 5–17)
AST SERPL-CCNC: 27 U/L — SIGNIFICANT CHANGE UP (ref 15–37)
BILIRUB SERPL-MCNC: 0.3 MG/DL — SIGNIFICANT CHANGE UP (ref 0.2–1.2)
BUN SERPL-MCNC: 10 MG/DL — SIGNIFICANT CHANGE UP (ref 7–23)
CALCIUM SERPL-MCNC: 8.4 MG/DL — LOW (ref 8.5–10.1)
CHLORIDE SERPL-SCNC: 100 MMOL/L — SIGNIFICANT CHANGE UP (ref 96–108)
CHOLEST SERPL-MCNC: 115 MG/DL — SIGNIFICANT CHANGE UP (ref 10–199)
CO2 SERPL-SCNC: 25 MMOL/L — SIGNIFICANT CHANGE UP (ref 22–31)
CREAT SERPL-MCNC: 0.87 MG/DL — SIGNIFICANT CHANGE UP (ref 0.5–1.3)
GLUCOSE SERPL-MCNC: 97 MG/DL — SIGNIFICANT CHANGE UP (ref 70–99)
HDLC SERPL-MCNC: 35 MG/DL — LOW
LIPID PNL WITH DIRECT LDL SERPL: 61 MG/DL — SIGNIFICANT CHANGE UP
POTASSIUM SERPL-MCNC: 3.7 MMOL/L — SIGNIFICANT CHANGE UP (ref 3.5–5.3)
POTASSIUM SERPL-SCNC: 3.7 MMOL/L — SIGNIFICANT CHANGE UP (ref 3.5–5.3)
PROT SERPL-MCNC: 7.4 G/DL — SIGNIFICANT CHANGE UP (ref 6–8.3)
SARS-COV-2 IGG SERPL QL IA: NEGATIVE — SIGNIFICANT CHANGE UP
SARS-COV-2 IGM SERPL IA-ACNC: 0.08 INDEX — SIGNIFICANT CHANGE UP
SODIUM SERPL-SCNC: 134 MMOL/L — LOW (ref 135–145)
TOTAL CHOLESTEROL/HDL RATIO MEASUREMENT: 3.3 RATIO — SIGNIFICANT CHANGE UP (ref 3.3–7.1)
TRIGL SERPL-MCNC: 94 MG/DL — SIGNIFICANT CHANGE UP (ref 10–149)

## 2020-10-08 PROCEDURE — 76770 US EXAM ABDO BACK WALL COMP: CPT | Mod: 26

## 2020-10-08 RX ORDER — SODIUM CHLORIDE 9 MG/ML
1000 INJECTION, SOLUTION INTRAVENOUS
Refills: 0 | Status: COMPLETED | OUTPATIENT
Start: 2020-10-08 | End: 2020-10-08

## 2020-10-08 RX ADMIN — Medication 81 MILLIGRAM(S): at 12:00

## 2020-10-08 RX ADMIN — Medication 250 MILLIGRAM(S): at 05:49

## 2020-10-08 RX ADMIN — SODIUM CHLORIDE 250 MILLILITER(S): 9 INJECTION, SOLUTION INTRAVENOUS at 12:59

## 2020-10-08 RX ADMIN — SODIUM CHLORIDE 250 MILLILITER(S): 9 INJECTION, SOLUTION INTRAVENOUS at 11:59

## 2020-10-08 RX ADMIN — Medication 250 MILLIGRAM(S): at 17:10

## 2020-10-08 RX ADMIN — Medication 1 DROP(S): at 17:10

## 2020-10-08 RX ADMIN — Medication 1 TABLET(S): at 12:00

## 2020-10-08 RX ADMIN — Medication 1 DROP(S): at 05:49

## 2020-10-08 RX ADMIN — ONDANSETRON 4 MILLIGRAM(S): 8 TABLET, FILM COATED ORAL at 07:59

## 2020-10-08 RX ADMIN — CEFTRIAXONE 100 MILLIGRAM(S): 500 INJECTION, POWDER, FOR SOLUTION INTRAMUSCULAR; INTRAVENOUS at 05:49

## 2020-10-08 RX ADMIN — ATORVASTATIN CALCIUM 20 MILLIGRAM(S): 80 TABLET, FILM COATED ORAL at 21:28

## 2020-10-08 NOTE — PROGRESS NOTE ADULT - PROBLEM SELECTOR PLAN 3
- Presented with failed outpatient UTI with multiple courses of antibiotics ~ 6 weeks   no further episodes of hematuria  - urine cultures, blood cultures pending   - KUB:  urinary bladder: Mild wall thickening. Volume 99 mL. No hydronephrosis.  - continue with rocephin - Presented with failed outpatient UTI with multiple courses of antibiotics ~ 6 weeks   no further episodes of hematuria  - urine cultures, blood cultures pending   - KUB:  urinary bladder: Mild wall thickening. Volume 99 mL. No hydronephrosis.  - continue with rocephin pending culture results - Presented with failed outpatient UTI with multiple courses of antibiotics ~ 6 weeks   no further episodes of hematuria  - urine cultures, blood cultures pending   - KUB:  urinary bladder: Mild wall thickening. Volume 99 mL. No hydronephrosis.  - continue with Rocephin pending culture results  -ID Dr You rhoades done

## 2020-10-08 NOTE — SBIRT NOTE ADULT - NSSBIRTSCREENAVAIL_GEN_A_CORE
Pt reported that she drinks socially and will have a drink when she goes out to dinner. Pt reported that she has had been out to dinner 2x since the pandemic started and is not worried about ETOH use./No

## 2020-10-08 NOTE — PROGRESS NOTE ADULT - PROBLEM SELECTOR PLAN 1
- s/p 1.7 L NS bolus with adequate rise in Na now 134   - gave D5W for now as Na maverick too quickly  - avoid > 8 mEq/L in 24 hours 126 on admission, asymptomatic  - s/p 1.7 L NS bolus with adequate rise in Na now 134   - will give D5W bolus for now as Na maverick too quickly  - avoid > 8 mEq/L in 24 hours 126 on admission, asymptomatic-S/P IVF   - s/p 1.7 L NS bolus with adequate rise in Na now 134   - will give D5W bolus for now as Na maverick quickly  - avoid > 8 mEq/L in 24 hours  -BMP in AM , encourage PO diet

## 2020-10-08 NOTE — CONSULT NOTE ADULT - SUBJECTIVE AND OBJECTIVE BOX
[facility], Division of Infectious Diseases  BELEN Duenas, JOSE GUADALUPE Nova  [phone]    ANGELA BECK  65y, Female  [phone]    HPI--  HPI:  Pt is a 70W w/ PMHx HLD, hx of C. diff and recurrent UTIs who is p/w diarrhea x4 days. Pt has had recurrent UTIs w/ the following treatments: nitrofurantoin and ceftin (), ciprofloxacin (9/15), bactrim (--did not tolerated 2/2 nausea) and again restarted on nitrofurantoin (10/5). Pt has been reporting intermittent worsening cramping pain in the lower abdomen x6 wks, pain would mildly improved on Abx treatment. Pt now reports foul smelling watery stools since Saturday and NBNB vomiting x1 episode .  This prompted the patient to go to the ED because of her previous hx of C Diff (~8 years ago). Pt reports loss of appetite and has not been able to eat or drink much since Monday. Pt also reports of feeling dehydrated even though she has been enough drinking water. Pt had 1 episode of hematuria (3 weeks ago), as well as a general sensation of pressure, dysuria, and urgency on and off for the last 6 weeks.   In the ED, pt afebrile, VSS. Mild leukocytosis to 11. Labs also notable for hyponatremia.  UA: grossly positive for nitrites, mod leuk esterase and large amount of blood, no white cells   In the ED, pt started on ceftriaxone.  KUB: Mild bladder wall thickening    Pt seen and examined at bedside. Today pt reports a mild cough and runny nose, consistent w/ allergies per pt.   Denies any additional episodes of diarrhea and states her nausea has improved. No episodes of diarrhea since admission.  Denies SOB, CP, palpitations, abdominal pain, n/v/d.     Active Medications--  artificial  tears Solution 1 Drop(s) Both EYES two times a day  aspirin enteric coated 81 milliGRAM(s) Oral daily  atorvastatin 20 milliGRAM(s) Oral at bedtime  cefTRIAXone   IVPB 1000 milliGRAM(s) IV Intermittent every 24 hours  dextrose 5%. 1000 milliLiter(s) IV Continuous <Continuous>  enoxaparin Injectable 40 milliGRAM(s) SubCutaneous daily  influenza   Vaccine 0.5 milliLiter(s) IntraMuscular once  multivitamin 1 Tablet(s) Oral daily  nicotine   7 mG/24 Hr(s) Transdermal Patch -  Peds 1 Patch Transdermal daily  ondansetron Injectable 4 milliGRAM(s) IV Push every 6 hours PRN  saccharomyces boulardii 250 milliGRAM(s) Oral two times a day    Antimicrobials:   cefTRIAXone   IVPB 1000 milliGRAM(s) IV Intermittent every 24 hours    Immunologic: influenza   Vaccine 0.5 milliLiter(s) IntraMuscular once      ROS:  CONSTITUTIONAL: No fevers or chills. No weakness or headache. No weight changes.  EYES/ENT: No visual or hearing changes. No sore throat or throat pain .  NECK: No pain or stiffness  RESPIRATORY: No cough, wheezing, or hemoptysis. No shortness of breath  CARDIOVASCULAR: No chest pain or palpitations  GASTROINTESTINAL: No abdominal pain. No nausea or vomiting. No diarrhea or constipation.  GENITOURINARY: No dysuria, frequency or hematuria  NEUROLOGICAL: No numbness or weakness  SKIN: No itching or rashes  PSYCHIATRIC: Pleasant. Appropriate affect    Allergies: No Known Allergies    PMH -- Hematuria, unspecified type  Current tobacco use  Dry eyes  Chronic back pain      PSH -- No significant past surgical history    FH -- FH: lung cancer  HTN (hypertension)  CAD (coronary artery disease) (Mother)    Social History --  EtOH: denies   Tobacco: denies   Drug Use: denies     Travel/Environmental/Occupational History:  none    Physical Exam--  Vital Signs Last 24 Hrs  T(F): 98.3 (08 Oct 2020 12:10), Max: 100 (07 Oct 2020 17:38)  HR: 75 (08 Oct 2020 12:10) (75 - 96)  BP: 125/68 (08 Oct 2020 12:10) (118/50 - 178/72)  RR: 17 (08 Oct 2020 12:10) (16 - 17)  SpO2: 95% (08 Oct 2020 12:10) (94% - 98%)  General: nontoxic-appearing, no acute distress  HEENT: NC/AT, EOMI, anicteric, conjunctiva pink and moist, oropharynx clear, dentition fair  Neck: Not rigid. No sense of mass. No LAD  Lungs: Clear bilaterally without rales, wheezing or rhonchi  Heart: Regular rate and rhythm. No murmur, rub or gallop.  Abdomen: Soft. Nondistended. Nontender. Bowel sounds present. No organomegaly.  Back: No spinal tenderness. No costovertebral angle tenderness.  Extremities: No cyanosis or clubbing. No edema.   Skin: Warm. Dry. Good turgor. No rash. No vasculitic stigmata.  Psychiatric: Appropriate affect and mood for situation.     Laboratory & Imaging Data--  CBC:                       11.5   8.36  )-----------( 269      ( 08 Oct 2020 10:21 )             32.6     CMP: 10-08    134<L>  |  100  |  10  ----------------------------<  97  3.7   |  25  |  0.87    Ca    8.4<L>      08 Oct 2020 07:45    TPro  7.4  /  Alb  3.0<L>  /  TBili  0.3  /  DBili  x   /  AST  27  /  ALT  34  /  AlkPhos  94  10-08    LIVER FUNCTIONS - ( 08 Oct 2020 07:45 )  Alb: 3.0 g/dL / Pro: 7.4 g/dL / ALK PHOS: 94 U/L / ALT: 34 U/L / AST: 27 U/L / GGT: x           Urinalysis Basic - ( 07 Oct 2020 11:00 )    Color: Yellow / Appearance: Turbid / S.010 / pH: x  Gluc: x / Ketone: Small  / Bili: Negative / Urobili: 1   Blood: x / Protein: 100 / Nitrite: Positive   Leuk Esterase: Moderate / RBC: 6-10 /HPF / WBC TNTC /HPF   Sq Epi: x / Non Sq Epi: Few / Bacteria: TNTC      Microbiology:     Radiology:  < from: US Kidney and Bladder (10.08.20 @ 09:19) >    EXAM:  US KIDNEYS AND BLADDER                            PROCEDURE DATE:  10/08/2020          INTERPRETATION:  CLINICAL INFORMATION: Urinary tract infection    COMPARISON: None available.    TECHNIQUE: Sonography of the kidneys and bladder.    FINDINGS:    Right kidney: 10.3 cm. No renal mass, hydronephrosis or calculi.    Left kidney: 12.2 cm. No renal mass, hydronephrosis or calculi.    Urinary bladder: Mild wall thickening. Volume 99 mL.    IMPRESSION:    No hydronephrosis.                  ANTOINETTE SNYDER M.D., ATTENDING RADIOLOGIST  This document has been electronically signed. Oct  8 2020  9:21AM    < end of copied text >  < from: Xray Chest 1 View-PORTABLE IMMEDIATE (10.07.20 @ 10:18) >    EXAM:  XR CHEST PORTABLE IMMED 1V                            PROCEDURE DATE:  10/07/2020          INTERPRETATION:  Chest one view    HISTORY: Sepsis    COMPARISON STUDY: 2019    Frontal view of the chest shows the heart to be normal in size. The lungs are clear and there is no evidence of pneumothorax nor pleural effusion.    IMPRESSION:  No active pulmonary disease.    Thank you for the courtesy of this referral.              RAMANA MCKEON M.D., ATTENDING RADIOLOGIST  This document has been electronically signed. Oct  7 2020 11:24AM    < end of copied text >

## 2020-10-08 NOTE — CONSULT NOTE ADULT - ATTENDING COMMENTS
Infectious Diseases will continue to follow.   Please call with any questions.   Kristy Merlos M.D.  [facility], Division of Infectious Diseases [phone]  For over the weekend and after hours, please call [phone]

## 2020-10-08 NOTE — PROGRESS NOTE ADULT - PROBLEM SELECTOR PLAN 8
DVT prophylaxis: lovenox 40 mg subq daily     IMPROVE VTE Individual Risk Assessment          RISK                                                          Points  [  ] Previous VTE                                                3  [  ] Thrombophilia                                             2  [  ] Lower limb paralysis                                   2        (unable to hold up >15 seconds)    [  ] Current Cancer                                             2         (within 6 months)  [  ] Immobilization > 24 hrs                              1  [  ] ICU/CCU stay > 24 hours                             1  [ x ] Age > 60                                                         1    IMPROVE VTE Score:         [     1    ]

## 2020-10-08 NOTE — CONSULT NOTE ADULT - ASSESSMENT
Pt is a 70W w/ PMHx HLD, hx of C. diff and recurrent UTIs who is p/w diarrhea x4 days. Mild leukocytosis w/ +UA. Overall picture concerning for persistent UTI failed outpatient therapy, additional concern for possible C. diff given hx and recent prolonged Abx use however pt w/o diarrhea at this time.     UTI failed outpatient therapy  -awaiting blood/urine cultures  -c/w ceftriaxone for now, will await above cultures to help guide therapy    Diarrhea, abdominal pain  -Initial concern for C. diff but diarrhea resolved  -no need to send sample at this time, however if diarrhea recurs, would send for C. diff and stool GI PCR    Hyponatremia  -likely in the setting of poor PO intake and volume loss in the setting of diarrhea

## 2020-10-08 NOTE — PROGRESS NOTE ADULT - PROBLEM SELECTOR PLAN 2
- likely secondary to multiple rounds of antibiotic use due to failed UTI outpatient   - no further concern for C diff  - no further episodes of diarrhea or BM  - abdominal pain has resolved - likely secondary to multiple rounds of antibiotic use due to failed UTI outpatient   - no further concern for C diff as patient has had no further episodes of diarrhea or BM  - abdominal pain has resolved - likely secondary to multiple rounds of antibiotic use due to failed UTI outpatient   - no further concern for C diff as patient has had no further episodes of diarrhea or BM, D/C Isolation  - abdominal pain has resolved

## 2020-10-08 NOTE — PROGRESS NOTE ADULT - SUBJECTIVE AND OBJECTIVE BOX
Patient is a 65y old  Female who presents with a chief complaint of Hyponatremia, recurrent UTI (07 Oct 2020 13:50)      INTERVAL HPI: 71 yo F patient with PMH current smoker, HLD, C. diff. and recurrent UTI. Presents today to the ED c/o  2x nonbloody foul smelling diarrhea since Saturday. Pt had a UTI for the last 6 weeks that has been getting worse despite multiple abx therapy. Pt had been on Nitrofurantoin (), ciprofloxacin (september 15) and Bactrim () and then recently restarted on Nitrofurantoin. Pt only took 2 doses of bactrim due to nausea so had to be started on nitrofurantoin again. Pt has been reporting worsening cramping pain in the lower abdomen on and off for about 6 weeks. She states that the pain improved when on Nitrofurantoin initially and cefitin, however the cramping kept returning. When patient was started on macrobid the second time she had 2 episodes of nonbloody foul smelling diarrhea on Saturday and  and 1 episode of bilious nonbloody vomiting on . She also admitted subjective fever X1  on Saturday. She states that she doesn't feel well overall. This prompted the patient to go to the ED because of her previous hx of C Diff (~8 years ago). Pt reports loss of appetite and has not been able to eat or drink much since Monday. Pt also reports of feeling dehydrated even though she has been enough drinking water. Pt had 1 episode of hematuria (3 weeks ago), as well as a general sensation of pressure, dysuria, and urgency on and off for the last 6 weeks.     Patient with recurrent UTI, was given Nitrofurantoin (),ciprofloxacin (september 15) and Bactrim ().      of note- pt does not have PCP    In the ED Vitals:   /65 HR 96 T 98.6 F SpO2 98% RA   Labs:   WBC: 11.46 with bands Neutrophil 9.54   H/H: 11.7/31.7  UA: grossly positive for nitrites, mod leuk esterase and large amount of blood, no white cells   CXR: no active pulmonary disease     Given 1.7 L NS bolus X 1  Started on IV Rocephin 1 g     10/8/2020: Patient and examined at bedside. reports a mild cough and runny nose. No fever. says it is likely from allergies and usually takes flonase. Offered to flonase, however she refused. denies any additional episodes of diarrhea and states her nausea has improved. Has not had a BM since admission. denies cp, palpitations, sob, abdominal pain, n/v/d/c.     - no BM C diff unlikely since patient has not had any BM   - kidney and bladder ultrasound: urinary bladder: Mild wall thickening. Volume 99 mL.No hydronephrosis.    OVERNIGHT EVENTS: No acute overnight events.     Home Medications:  Acidophilus oral tablet: 1 tab(s) orally 2 times a day (07 Oct 2020 13:00)  aspirin 81 mg oral tablet: 1 tab(s) orally once a day (07 Oct 2020 13:00)  atorvastatin 20 mg oral tablet: 1 tab(s) orally once a day (07 Oct 2020 13:00)  Cranberry oral tablet: 1 tab(s) orally once a day (07 Oct 2020 13:00)  Dry Eye Relief ophthalmic solution: 1 drop(s) to each affected eye 4 times a day, As Needed (07 Oct 2020 13:00)  Glucosamine Chondroitin oral capsule: 1 cap(s) orally once a day (07 Oct 2020 13:00)  Multiple Vitamins oral tablet: 1 tab(s) orally once a day (07 Oct 2020 13:00)  Restasis 0.05% ophthalmic emulsion: 1 drop(s) to each affected eye every 12 hours (07 Oct 2020 13:00)  Vitamin C: 1 cap(s) orally once a day (07 Oct 2020 13:00)  vitamin E oral capsule: 1 cap(s) orally once a day (07 Oct 2020 13:00)      MEDICATIONS  (STANDING):  artificial  tears Solution 1 Drop(s) Both EYES two times a day  aspirin enteric coated 81 milliGRAM(s) Oral daily  atorvastatin 20 milliGRAM(s) Oral at bedtime  cefTRIAXone   IVPB 1000 milliGRAM(s) IV Intermittent every 24 hours  dextrose 5%. 1000 milliLiter(s) (250 mL/Hr) IV Continuous <Continuous>  enoxaparin Injectable 40 milliGRAM(s) SubCutaneous daily  influenza   Vaccine 0.5 milliLiter(s) IntraMuscular once  multivitamin 1 Tablet(s) Oral daily  nicotine   7 mG/24 Hr(s) Transdermal Patch -  Peds 1 Patch Transdermal daily  saccharomyces boulardii 250 milliGRAM(s) Oral two times a day    MEDICATIONS  (PRN):  ondansetron Injectable 4 milliGRAM(s) IV Push every 6 hours PRN Nausea      No Known Allergies      Social History:  Alcohol: socially   Tobacco: 50 pk/years   Drug: N/A  Ambulate: Pt is able to ambulate independently  Occupation: Retired (previously worked in sales and finance) (07 Oct 2020 13:50)      REVIEW OF SYSTEMS:  CONSTITUTIONAL: No fever, No chills, No fatigue, No myalgia, No Body ache, No Weakness  EYES: No eye pain,  No visual disturbances, No discharge, No Redness  ENMT: No ear pain, No nose bleed, No vertigo; No sinus pain, No throat pain, No Congestion  NECK: No pain, No stiffness  RESPIRATORY: No cough, No wheezing, No hemoptysis, No shortness of breath  CARDIOVASCULAR: No chest pain, No palpitations  GASTROINTESTINAL: No abdominal pain, No epigastric pain. No nausea, No vomiting, No diarrhea, No constipation; [  ] BM  GENITOURINARY: No dysuria, No frequency, No urgency, No hematuria, No incontinence  NEUROLOGICAL: No headaches, No dizziness, No numbness, No tingling, No tremors, No weakness  EXTREMITIES: No Swelling, No Pain, No Edema  SKIN: [  ] No itching, burning, rashes, or lesions   MUSCULOSKELETAL: No joint pain, No joint swelling; No muscle pain, No back pain, No extremity pain  PSYCHIATRIC: No depression, No anxiety, No mood swings, No difficulty sleeping at night  PAIN SCALE: [  ] None  [  ] Other-  ROS Unable to obtain due to: [  ] Dementia  [  ] Lethargy  [  ] Sedated  [  ] Non verbal  REST OF REVIEW OF SYSTEMS: [  ] Normal     Vital Signs Last 24 Hrs  T(C): 36.9 (08 Oct 2020 05:23), Max: 37.8 (07 Oct 2020 17:38)  T(F): 98.4 (08 Oct 2020 05:23), Max: 100 (07 Oct 2020 17:38)  HR: 75 (08 Oct 2020 05:23) (75 - 96)  BP: 118/50 (08 Oct 2020 05:23) (118/50 - 178/72)  BP(mean): --  RR: 17 (08 Oct 2020 05:23) (16 - 17)  SpO2: 94% (08 Oct 2020 05:23) (94% - 98%)    CAPILLARY BLOOD GLUCOSE          I&O's Summary    PHYSICAL EXAM:  GENERAL:  [ x ] NAD, [ x ] Well appearing, [  ] Agitated, [  ] Drowsy, [  ] Lethargy, [  ] Confused   HEAD:  [ x ] Normal, [  ] Other  EYES:  [x ] EOMI, [  ] PERRLA, [ x ] Conjunctiva and sclera clear normal, [  ] Other, [  ] Pallor, [  ] Discharge  ENMT:  [ x ] Normal, [ x ] Moist mucous membranes, [ x ] Good dentition, [ x ] No thrush  NECK:  [ x ] Supple, [ x ] No JVD, [ x ] Normal thyroid, [ x ] Lymphadenopathy, [  ] Other  CHEST/LUNG:  [ x ] Clear to auscultation bilaterally, [  ] Breath Sounds equal B/L / decreased, [  ] Poor effort, [ x ] No rales, [ x ] No rhonchi, [x  ] No wheezing  HEART:  [x  ] Regular rate and rhythm, [  ] Tachycardia, [  ] Bradycardia, [  ] Irregular, [ x ] No murmurs, No rubs, No gallops, [  ] PPM in place (Mfr:  )  ABDOMEN:  [ x ] Soft, [x  ] Nontender, [ x ] Nondistended, [x  ] No mass, [ x ] Bowel sounds present, [  ] Obese; hyperactive bowel sounds   NERVOUS SYSTEM:  [ x ] Alert & Oriented x3, [  ] Nonfocal, [  ] Confusion, [  ] Encephalopathic, [  ] Sedated, [  ] Unable to assess, [  ] Dementia, [  ] Other-  EXTREMITIES:  [ x ] 2+ Peripheral Pulses, No clubbing, No cyanosis,  [  ] Edema B/L lower EXT, [  ] PVD stasis skin changes B/L lower EXT, [  ] Wound  LYMPH:  No lymphadenopathy noted  SKIN:  [ x ] No rashes or lesions, [  ] Pressure ulcers, [  ] Ecchymosis, [  ] Skin tears, [  ] Other    DIET: Diet, Regular (10-07-20 @ 15:47)      LABS:                        11.5   8.36  )-----------( 269      ( 08 Oct 2020 10:21 )             32.6     08 Oct 2020 07:45    134    |  100    |  10     ----------------------------<  97     3.7     |  25     |  0.87     Ca    8.4        08 Oct 2020 07:45    TPro  7.4    /  Alb  3.0    /  TBili  0.3    /  DBili  x      /  AST  27     /  ALT  34     /  AlkPhos  94     08 Oct 2020 07:45    PT/INR - ( 07 Oct 2020 11:39 )   PT: 14.0 sec;   INR: 1.21 ratio         PTT - ( 07 Oct 2020 11:39 )  PTT:28.4 sec  Urinalysis Basic - ( 07 Oct 2020 11:00 )    Color: Yellow / Appearance: Turbid / S.010 / pH: x  Gluc: x / Ketone: Small  / Bili: Negative / Urobili: 1   Blood: x / Protein: 100 / Nitrite: Positive   Leuk Esterase: Moderate / RBC: 6-10 /HPF / WBC TNTC /HPF   Sq Epi: x / Non Sq Epi: Few / Bacteria: TNTC                 Anemia Panel:      Thyroid Panel:                RADIOLOGY & ADDITIONAL TESTS:      HEALTH ISSUES - PROBLEM Dx:  Carotid stenosis  Carotid stenosis    Diarrhea  Diarrhea    Need for prophylactic measure  Need for prophylactic measure    Hematuria, unspecified type  Hematuria, unspecified type    Current tobacco use  Current tobacco use    UTI (urinary tract infection)  UTI (urinary tract infection)    Acute hyponatremia  Acute hyponatremia          Consultant(s) Notes Reviewed:  [  ] YES     Care Discussed with [ x ] Consultants, [  ] Patient, [  ] Family, [  ] HCP, [  ] , [  ] Social Service, [  ] RN, [  ] Physical Therapy, [  ] Palliative Care Team  DVT PPX: [ x ] Lovenox, [  ] SC Heparin, [  ] Coumadin, [  ] Xarelto, [  ] Eliquis, [  ] Pradaxa, [  ] IV Heparin drip, [  ] SCD, [  ] Ambulation, [  ] Contraindicated 2/2 GI Bleed, [  ] Contraindicated 2/2  Bleed, [  ] Contraindicated 2/2 Brain Bleed  Advanced Directive: [  ] None, [  ] DNR/DNI Patient is a 65y old  Female who presents with a chief complaint of Hyponatremia, recurrent UTI (07 Oct 2020 13:50)      INTERVAL HPI: 71 yo F patient with PMH current smoker, HLD, C. diff. and recurrent UTI. Presents today to the ED c/o  2x nonbloody foul smelling diarrhea since Saturday. Pt had a UTI for the last 6 weeks that has been getting worse despite multiple abx therapy. Pt had been on Nitrofurantoin (), ciprofloxacin (september 15) and Bactrim () and then recently restarted on Nitrofurantoin. Pt only took 2 doses of bactrim due to nausea so had to be started on nitrofurantoin again. Pt has been reporting worsening cramping pain in the lower abdomen on and off for about 6 weeks. She states that the pain improved when on Nitrofurantoin initially and cefitin, however the cramping kept returning. When patient was started on macrobid the second time she had 2 episodes of nonbloody foul smelling diarrhea on Saturday and  and 1 episode of bilious nonbloody vomiting on . She also admitted subjective fever X1  on Saturday. She states that she doesn't feel well overall. This prompted the patient to go to the ED because of her previous hx of C Diff (~8 years ago). Pt reports loss of appetite and has not been able to eat or drink much since Monday. Pt also reports of feeling dehydrated even though she has been enough drinking water. Pt had 1 episode of hematuria (3 weeks ago), as well as a general sensation of pressure, dysuria, and urgency on and off for the last 6 weeks.     Patient with recurrent UTI, was given Nitrofurantoin (),ciprofloxacin (september 15) and Bactrim ().      of note- pt does not have PCP    In the ED Vitals:   /65 HR 96 T 98.6 F SpO2 98% RA   Labs:   WBC: 11.46 with bands Neutrophil 9.54   H/H: 11.7/31.7  UA: grossly positive for nitrites, mod leuk esterase and large amount of blood, no white cells   CXR: no active pulmonary disease     Given 1.7 L NS bolus X 1  Started on IV Rocephin 1 g     10/8/2020: Patient and examined at bedside. reports a mild cough and runny nose. No fever. says it is likely from allergies and usually takes flonase. Offered to flonase, however she refused. denies any additional episodes of diarrhea and states her nausea has improved. Has not had a BM since admission. denies cp, palpitations, sob, abdominal pain, n/v/d/c.     - no BM C diff unlikely since patient has not had any BM   - kidney and bladder ultrasound: urinary bladder: Mild wall thickening. Volume 99 mL.No hydronephrosis.    OVERNIGHT EVENTS: No acute overnight events.     Home Medications:  Acidophilus oral tablet: 1 tab(s) orally 2 times a day (07 Oct 2020 13:00)  aspirin 81 mg oral tablet: 1 tab(s) orally once a day (07 Oct 2020 13:00)  atorvastatin 20 mg oral tablet: 1 tab(s) orally once a day (07 Oct 2020 13:00)  Cranberry oral tablet: 1 tab(s) orally once a day (07 Oct 2020 13:00)  Dry Eye Relief ophthalmic solution: 1 drop(s) to each affected eye 4 times a day, As Needed (07 Oct 2020 13:00)  Glucosamine Chondroitin oral capsule: 1 cap(s) orally once a day (07 Oct 2020 13:00)  Multiple Vitamins oral tablet: 1 tab(s) orally once a day (07 Oct 2020 13:00)  Restasis 0.05% ophthalmic emulsion: 1 drop(s) to each affected eye every 12 hours (07 Oct 2020 13:00)  Vitamin C: 1 cap(s) orally once a day (07 Oct 2020 13:00)  vitamin E oral capsule: 1 cap(s) orally once a day (07 Oct 2020 13:00)      MEDICATIONS  (STANDING):  artificial  tears Solution 1 Drop(s) Both EYES two times a day  aspirin enteric coated 81 milliGRAM(s) Oral daily  atorvastatin 20 milliGRAM(s) Oral at bedtime  cefTRIAXone   IVPB 1000 milliGRAM(s) IV Intermittent every 24 hours  dextrose 5%. 1000 milliLiter(s) (250 mL/Hr) IV Continuous <Continuous>  enoxaparin Injectable 40 milliGRAM(s) SubCutaneous daily  influenza   Vaccine 0.5 milliLiter(s) IntraMuscular once  multivitamin 1 Tablet(s) Oral daily  nicotine   7 mG/24 Hr(s) Transdermal Patch -  Peds 1 Patch Transdermal daily  saccharomyces boulardii 250 milliGRAM(s) Oral two times a day    MEDICATIONS  (PRN):  ondansetron Injectable 4 milliGRAM(s) IV Push every 6 hours PRN Nausea      No Known Allergies      Social History:  Alcohol: socially   Tobacco: 50 pk/years   Drug: N/A  Ambulate: Pt is able to ambulate independently  Occupation: Retired (previously worked in sales and finance) (07 Oct 2020 13:50)      REVIEW OF SYSTEMS:  CONSTITUTIONAL: No fever, No chills, No fatigue, No myalgia, No Body ache, No Weakness  EYES: No eye pain,  No visual disturbances, No discharge, No Redness  ENMT: No ear pain, No nose bleed, No vertigo; No sinus pain, No throat pain, No Congestion  NECK: No pain, No stiffness  RESPIRATORY: No cough, No wheezing, No hemoptysis, No shortness of breath  CARDIOVASCULAR: No chest pain, No palpitations  GASTROINTESTINAL: No abdominal pain, No epigastric pain. No nausea, No vomiting, No diarrhea, No constipation; [  ] BM  GENITOURINARY: No dysuria, No frequency, No urgency, No hematuria, No incontinence  NEUROLOGICAL: No headaches, No dizziness, No numbness, No tingling, No tremors, No weakness  EXTREMITIES: No Swelling, No Pain, No Edema  SKIN: [ x ] No itching, burning, rashes, or lesions   MUSCULOSKELETAL: No joint pain, No joint swelling; No muscle pain, No back pain, No extremity pain  PSYCHIATRIC: No depression, No anxiety, No mood swings, No difficulty sleeping at night  PAIN SCALE: [ x ] None  [  ] Other-  ROS Unable to obtain due to: [  ] Dementia  [  ] Lethargy  [  ] Sedated  [  ] Non verbal  REST OF REVIEW OF SYSTEMS: [  ] Normal     Vital Signs Last 24 Hrs  T(C): 36.9 (08 Oct 2020 05:23), Max: 37.8 (07 Oct 2020 17:38)  T(F): 98.4 (08 Oct 2020 05:23), Max: 100 (07 Oct 2020 17:38)  HR: 75 (08 Oct 2020 05:23) (75 - 96)  BP: 118/50 (08 Oct 2020 05:23) (118/50 - 178/72)  BP(mean): --  RR: 17 (08 Oct 2020 05:23) (16 - 17)  SpO2: 94% (08 Oct 2020 05:23) (94% - 98%)    CAPILLARY BLOOD GLUCOSE          I&O's Summary    PHYSICAL EXAM:  GENERAL:  [ x ] NAD, [ x ] Well appearing, [  ] Agitated, [  ] Drowsy, [  ] Lethargy, [  ] Confused   HEAD:  [ x ] Normal, [  ] Other  EYES:  [x ] EOMI, [  ] PERRLA, [ x ] Conjunctiva and sclera clear normal, [  ] Other, [  ] Pallor, [  ] Discharge  ENMT:  [ x ] Normal, [ x ] Moist mucous membranes, [ x ] Good dentition, [ x ] No thrush  NECK:  [ x ] Supple, [ x ] No JVD, [ x ] Normal thyroid, [ x ] Lymphadenopathy, [  ] Other  CHEST/LUNG:  [ x ] Clear to auscultation bilaterally, [  ] Breath Sounds equal B/L / decreased, [  ] Poor effort, [ x ] No rales, [ x ] No rhonchi, [x  ] No wheezing  HEART:  [x  ] Regular rate and rhythm, [  ] Tachycardia, [  ] Bradycardia, [  ] Irregular, [ x ] No murmurs, No rubs, No gallops, [  ] PPM in place (Mfr:  )  ABDOMEN:  [ x ] Soft, [x  ] Nontender, [ x ] Nondistended, [x  ] No mass, [ x ] Bowel sounds present, [  ] Obese; hyperactive bowel sounds   NERVOUS SYSTEM:  [ x ] Alert & Oriented x3, [  ] Nonfocal, [  ] Confusion, [  ] Encephalopathic, [  ] Sedated, [  ] Unable to assess, [  ] Dementia, [  ] Other-  EXTREMITIES:  [ x ] 2+ Peripheral Pulses, No clubbing, No cyanosis,  [  ] Edema B/L lower EXT, [  ] PVD stasis skin changes B/L lower EXT, [  ] Wound  LYMPH:  No lymphadenopathy noted  SKIN:  [ x ] No rashes or lesions, [  ] Pressure ulcers, [  ] Ecchymosis, [  ] Skin tears, [  ] Other    DIET: Diet, Regular (10-07-20 @ 15:47)      LABS:                        11.5   8.36  )-----------( 269      ( 08 Oct 2020 10:21 )             32.6     08 Oct 2020 07:45    134    |  100    |  10     ----------------------------<  97     3.7     |  25     |  0.87     Ca    8.4        08 Oct 2020 07:45    TPro  7.4    /  Alb  3.0    /  TBili  0.3    /  DBili  x      /  AST  27     /  ALT  34     /  AlkPhos  94     08 Oct 2020 07:45    PT/INR - ( 07 Oct 2020 11:39 )   PT: 14.0 sec;   INR: 1.21 ratio         PTT - ( 07 Oct 2020 11:39 )  PTT:28.4 sec  Urinalysis Basic - ( 07 Oct 2020 11:00 )    Color: Yellow / Appearance: Turbid / S.010 / pH: x  Gluc: x / Ketone: Small  / Bili: Negative / Urobili: 1   Blood: x / Protein: 100 / Nitrite: Positive   Leuk Esterase: Moderate / RBC: 6-10 /HPF / WBC TNTC /HPF   Sq Epi: x / Non Sq Epi: Few / Bacteria: TNTC                 Anemia Panel:      Thyroid Panel:                RADIOLOGY & ADDITIONAL TESTS:      HEALTH ISSUES - PROBLEM Dx:  Carotid stenosis  Carotid stenosis    Diarrhea  Diarrhea    Need for prophylactic measure  Need for prophylactic measure    Hematuria, unspecified type  Hematuria, unspecified type    Current tobacco use  Current tobacco use    UTI (urinary tract infection)  UTI (urinary tract infection)    Acute hyponatremia  Acute hyponatremia          Consultant(s) Notes Reviewed:  [  ] YES     Care Discussed with [ x ] Consultants, [  ] Patient, [  ] Family, [  ] HCP, [  ] , [  ] Social Service, [  ] RN, [  ] Physical Therapy, [  ] Palliative Care Team  DVT PPX: [ x ] Lovenox, [  ] SC Heparin, [  ] Coumadin, [  ] Xarelto, [  ] Eliquis, [  ] Pradaxa, [  ] IV Heparin drip, [  ] SCD, [  ] Ambulation, [  ] Contraindicated 2/2 GI Bleed, [  ] Contraindicated 2/2  Bleed, [  ] Contraindicated 2/2 Brain Bleed  Advanced Directive: [  ] None, [  ] DNR/DNI Patient is a 65y old  Female who presents with a chief complaint of Hyponatremia, recurrent UTI (07 Oct 2020 13:50)      INTERVAL HPI: 71 yo F patient with PMH current smoker, HLD, C. diff. and recurrent UTI. Presents today to the ED c/o  2x nonbloody foul smelling diarrhea since Saturday. Pt had a UTI for the last 6 weeks that has been getting worse despite multiple abx therapy. Pt had been on Nitrofurantoin (), ciprofloxacin (september 15) and Bactrim () and then recently restarted on Nitrofurantoin. Pt only took 2 doses of bactrim due to nausea so had to be started on nitrofurantoin again. Pt has been reporting worsening cramping pain in the lower abdomen on and off for about 6 weeks. She states that the pain improved when on Nitrofurantoin initially and cefitin, however the cramping kept returning. When patient was started on macrobid the second time she had 2 episodes of nonbloody foul smelling diarrhea on Saturday and  and 1 episode of bilious nonbloody vomiting on . She also admitted subjective fever X1  on Saturday. She states that she doesn't feel well overall. This prompted the patient to go to the ED because of her previous hx of C Diff (~8 years ago). Pt reports loss of appetite and has not been able to eat or drink much since Monday. Pt also reports of feeling dehydrated even though she has been enough drinking water. Pt had 1 episode of hematuria (3 weeks ago), as well as a general sensation of pressure, dysuria, and urgency on and off for the last 6 weeks.     Patient with recurrent UTI, was given Nitrofurantoin (),ciprofloxacin (september 15) and Bactrim ().      of note- pt does not have PCP    In the ED Vitals:   /65 HR 96 T 98.6 F SpO2 98% RA   Labs:   WBC: 11.46 with bands Neutrophil 9.54   H/H: 11.7/31.7  UA: grossly positive for nitrites, mod leuk esterase and large amount of blood, no white cells   CXR: no active pulmonary disease     Given 1.7 L NS bolus X 1  Started on IV Rocephin 1 g     10/8/2020: Patient and examined at bedside. reports a mild cough and runny nose. No fever. says it is likely from allergies and usually takes flonase. Offered to flonase, however she refused. denies any additional episodes of diarrhea and states her nausea has improved. Has not had a BM since admission. denies cp, palpitations, sob, abdominal pain, n/v/d/c.     - no BM C diff unlikely since patient has not had any BM   - kidney and bladder ultrasound: urinary bladder: Mild wall thickening. Volume 99 mL.No hydronephrosis. Pt seen By ID- Dr [email or web address]     OVERNIGHT EVENTS: No acute overnight events.     Home Medications:  Acidophilus oral tablet: 1 tab(s) orally 2 times a day (07 Oct 2020 13:00)  aspirin 81 mg oral tablet: 1 tab(s) orally once a day (07 Oct 2020 13:00)  atorvastatin 20 mg oral tablet: 1 tab(s) orally once a day (07 Oct 2020 13:00)  Cranberry oral tablet: 1 tab(s) orally once a day (07 Oct 2020 13:00)  Dry Eye Relief ophthalmic solution: 1 drop(s) to each affected eye 4 times a day, As Needed (07 Oct 2020 13:00)  Glucosamine Chondroitin oral capsule: 1 cap(s) orally once a day (07 Oct 2020 13:00)  Multiple Vitamins oral tablet: 1 tab(s) orally once a day (07 Oct 2020 13:00)  Restasis 0.05% ophthalmic emulsion: 1 drop(s) to each affected eye every 12 hours (07 Oct 2020 13:00)  Vitamin C: 1 cap(s) orally once a day (07 Oct 2020 13:00)  vitamin E oral capsule: 1 cap(s) orally once a day (07 Oct 2020 13:00)      MEDICATIONS  (STANDING):  artificial  tears Solution 1 Drop(s) Both EYES two times a day  aspirin enteric coated 81 milliGRAM(s) Oral daily  atorvastatin 20 milliGRAM(s) Oral at bedtime  cefTRIAXone   IVPB 1000 milliGRAM(s) IV Intermittent every 24 hours  dextrose 5%. 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  CONSTITUTIONAL: No fever, No chills, No fatigue, No myalgia, No Body ache, No Weakness  EYES: No eye pain,  No visual disturbances, No discharge, No Redness  ENMT: No ear pain, No nose bleed, No vertigo; No sinus pain, No throat pain, No Congestion  NECK: No pain, No stiffness  RESPIRATORY: No cough, No wheezing, No hemoptysis, No shortness of breath  CARDIOVASCULAR: No chest pain, No palpitations  GASTROINTESTINAL: No abdominal pain, No epigastric pain. No nausea, No vomiting, No diarrhea, No constipation; [  ] BM-NONE  GENITOURINARY: No dysuria, No frequency, No urgency, No hematuria, No incontinence  NEUROLOGICAL: No headaches, No dizziness, No numbness, No tingling, No tremors, No weakness  EXTREMITIES: No Swelling, No Pain, No Edema  SKIN: [ x ] No itching, burning, rashes, or lesions   MUSCULOSKELETAL: No joint pain, No joint swelling; No muscle pain, No back pain, No extremity pain  PSYCHIATRIC: No depression, No anxiety, No mood swings, No difficulty sleeping at night  PAIN SCALE: [ x ] None  [  ] Other-  ROS Unable to obtain due to: [  ] Dementia  [  ] Lethargy  [  ] Sedated  [  ] Non verbal  REST OF REVIEW OF SYSTEMS: [ x ] Normal     Vital Signs Last 24 Hrs  T(C): 36.9 (08 Oct 2020 05:23), Max: 37.8 (07 Oct 2020 17:38)  T(F): 98.4 (08 Oct 2020 05:23), Max: 100 (07 Oct 2020 17:38)  HR: 75 (08 Oct 2020 05:23) (75 - 96)  BP: 118/50 (08 Oct 2020 05:23) (118/50 - 178/72)  BP(mean): --  RR: 17 (08 Oct 2020 05:23) (16 - 17)  SpO2: 94% (08 Oct 2020 05:23) (94% - 98%)    CAPILLARY BLOOD GLUCOSE          I&O's Summary    PHYSICAL EXAM:  GENERAL:  [ x ] NAD, [ x ] Well appearing, [  ] Agitated, [  ] Drowsy, [  ] Lethargy, [  ] Confused   HEAD:  [ x ] Normal, [  ] Other  EYES:  [x ] EOMI, [x  ] PERRLA, [ x ] Conjunctiva and sclera clear normal, [  ] Other, [  ] Pallor, [  ] Discharge  ENMT:  [ x ] Normal, [ x ] Moist mucous membranes, [ x ] Good dentition, [ x ] No thrush  NECK:  [ x ] Supple, [ x ] No JVD, [ x ] Normal thyroid, [ x ] Lymphadenopathy, [  ] Other  CHEST/LUNG:  [ x ] Clear to auscultation bilaterally, [x  ] Breath Sounds equal B/L / decreased, [  ] Poor effort, [ x ] No rales, [ x ] No rhonchi, [x  ] No wheezing  HEART:  [x  ] Regular rate and rhythm, [  ] Tachycardia, [  ] Bradycardia, [  ] Irregular, [ x ] No murmurs, No rubs, No gallops, [  ] PPM in place (Mfr:  )  ABDOMEN:  [ x ] Soft, [x  ] Nontender, [ x ] Nondistended, [x  ] No mass, [ x ] Bowel sounds present, [  ] Obese; hyperactive bowel sounds   NERVOUS SYSTEM:  [ x ] Alert & Oriented x3, [x  ] Nonfocal, [  ] Confusion, [  ] Encephalopathic, [  ] Sedated, [  ] Unable to assess, [  ] Dementia, [  ] Other-  EXTREMITIES:  [ x ] 2+ Peripheral Pulses, No clubbing, No cyanosis,  [  ] Edema B/L lower EXT, [  ] PVD stasis skin changes B/L lower EXT, [  ] Wound  LYMPH:  No lymphadenopathy noted  SKIN:  [ x ] No rashes or lesions, [  ] Pressure ulcers, [  ] Ecchymosis, [  ] Skin tears, [  ] Other    DIET: Diet, Regular (10-07-20 @ 15:47)      LABS:                        11.5   8.36  )-----------( 269      ( 08 Oct 2020 10:21 )             32.6     08 Oct 2020 07:45    134    |  100    |  10     ----------------------------<  97     3.7     |  25     |  0.87     Ca    8.4        08 Oct 2020 07:45    TPro  7.4    /  Alb  3.0    /  TBili  0.3    /  DBili  x      /  AST  27     /  ALT  34     /  AlkPhos  94     08 Oct 2020 07:45    PT/INR - ( 07 Oct 2020 11:39 )   PT: 14.0 sec;   INR: 1.21 ratio         PTT - ( 07 Oct 2020 11:39 )  PTT:28.4 sec  Urinalysis Basic - ( 07 Oct 2020 11:00 )    Color: Yellow / Appearance: Turbid / S.010 / pH: x  Gluc: x / Ketone: Small  / Bili: Negative / Urobili: 1   Blood: x / Protein: 100 / Nitrite: Positive   Leuk Esterase: Moderate / RBC: 6-10 /HPF / WBC TNTC /HPF   Sq Epi: x / Non Sq Epi: Few / Bacteria: TNTC      RADIOLOGY & ADDITIONAL TESTS: NONE      HEALTH ISSUES - PROBLEM Dx:  Carotid stenosis  Carotid stenosis    Diarrhea  Diarrhea    Need for prophylactic measure  Need for prophylactic measure    Hematuria, unspecified type  Hematuria, unspecified type    Current tobacco use  Current tobacco use    UTI (urinary tract infection)  UTI (urinary tract infection)    Acute hyponatremia  Acute hyponatremia          Consultant(s) Notes Reviewed:  [ x ] YES     Care Discussed with [ x ] Consultants, [ x ] Patient, [  ] Family, [  ] HCP, [  ] , [  ] Social Service, [x  ] RN, [  ] Physical Therapy, [  ] Palliative Care Team  DVT PPX: [ x ] Lovenox, [  ] SC Heparin, [  ] Coumadin, [  ] Xarelto, [  ] Eliquis, [  ] Pradaxa, [  ] IV Heparin drip, [  ] SCD, [  ] Ambulation, [  ] Contraindicated 2/2 GI Bleed, [  ] Contraindicated 2/2  Bleed, [  ] Contraindicated 2/2 Brain Bleed  Advanced Directive: [x  ] None, [  ] DNR/DNI

## 2020-10-08 NOTE — PROGRESS NOTE ADULT - PROBLEM SELECTOR PLAN 4
- history of 50 pk/year smoking history; current smoker 3-4 cigarettes / day  - offered nicotine patch  - counseled on smoking cessation

## 2020-10-08 NOTE — PROGRESS NOTE ADULT - ASSESSMENT
64 yo Female patient with PMH of HLD, active smoker, C. diff (8 years ago) and recurrent UTI. Presents to the ED c/o diarrhea, admitted for hyponatremia and failed outpatient UTI.

## 2020-10-09 ENCOUNTER — TRANSCRIPTION ENCOUNTER (OUTPATIENT)
Age: 65
End: 2020-10-09

## 2020-10-09 VITALS
DIASTOLIC BLOOD PRESSURE: 75 MMHG | TEMPERATURE: 98 F | HEART RATE: 67 BPM | SYSTOLIC BLOOD PRESSURE: 166 MMHG | RESPIRATION RATE: 16 BRPM | OXYGEN SATURATION: 98 %

## 2020-10-09 LAB
-  AMIKACIN: SIGNIFICANT CHANGE UP
-  AMOXICILLIN/CLAVULANIC ACID: SIGNIFICANT CHANGE UP
-  AMPICILLIN/SULBACTAM: SIGNIFICANT CHANGE UP
-  AMPICILLIN: SIGNIFICANT CHANGE UP
-  AZTREONAM: SIGNIFICANT CHANGE UP
-  CEFAZOLIN: SIGNIFICANT CHANGE UP
-  CEFEPIME: SIGNIFICANT CHANGE UP
-  CEFOXITIN: SIGNIFICANT CHANGE UP
-  CEFTRIAXONE: SIGNIFICANT CHANGE UP
-  CIPROFLOXACIN: SIGNIFICANT CHANGE UP
-  ERTAPENEM: SIGNIFICANT CHANGE UP
-  GENTAMICIN: SIGNIFICANT CHANGE UP
-  IMIPENEM: SIGNIFICANT CHANGE UP
-  LEVOFLOXACIN: SIGNIFICANT CHANGE UP
-  MEROPENEM: SIGNIFICANT CHANGE UP
-  NITROFURANTOIN: SIGNIFICANT CHANGE UP
-  PIPERACILLIN/TAZOBACTAM: SIGNIFICANT CHANGE UP
-  TIGECYCLINE: SIGNIFICANT CHANGE UP
-  TOBRAMYCIN: SIGNIFICANT CHANGE UP
-  TRIMETHOPRIM/SULFAMETHOXAZOLE: SIGNIFICANT CHANGE UP
ANION GAP SERPL CALC-SCNC: 7 MMOL/L — SIGNIFICANT CHANGE UP (ref 5–17)
BUN SERPL-MCNC: 9 MG/DL — SIGNIFICANT CHANGE UP (ref 7–23)
CALCIUM SERPL-MCNC: 8.8 MG/DL — SIGNIFICANT CHANGE UP (ref 8.5–10.1)
CHLORIDE SERPL-SCNC: 102 MMOL/L — SIGNIFICANT CHANGE UP (ref 96–108)
CO2 SERPL-SCNC: 29 MMOL/L — SIGNIFICANT CHANGE UP (ref 22–31)
CREAT SERPL-MCNC: 0.78 MG/DL — SIGNIFICANT CHANGE UP (ref 0.5–1.3)
CULTURE RESULTS: SIGNIFICANT CHANGE UP
GLUCOSE SERPL-MCNC: 93 MG/DL — SIGNIFICANT CHANGE UP (ref 70–99)
HCT VFR BLD CALC: 33.8 % — LOW (ref 34.5–45)
HGB BLD-MCNC: 11.6 G/DL — SIGNIFICANT CHANGE UP (ref 11.5–15.5)
MCHC RBC-ENTMCNC: 33 PG — SIGNIFICANT CHANGE UP (ref 27–34)
MCHC RBC-ENTMCNC: 34.3 GM/DL — SIGNIFICANT CHANGE UP (ref 32–36)
MCV RBC AUTO: 96 FL — SIGNIFICANT CHANGE UP (ref 80–100)
METHOD TYPE: SIGNIFICANT CHANGE UP
NRBC # BLD: 0 /100 WBCS — SIGNIFICANT CHANGE UP (ref 0–0)
ORGANISM # SPEC MICROSCOPIC CNT: SIGNIFICANT CHANGE UP
ORGANISM # SPEC MICROSCOPIC CNT: SIGNIFICANT CHANGE UP
PLATELET # BLD AUTO: 286 K/UL — SIGNIFICANT CHANGE UP (ref 150–400)
POTASSIUM SERPL-MCNC: 4.4 MMOL/L — SIGNIFICANT CHANGE UP (ref 3.5–5.3)
POTASSIUM SERPL-SCNC: 4.4 MMOL/L — SIGNIFICANT CHANGE UP (ref 3.5–5.3)
RBC # BLD: 3.52 M/UL — LOW (ref 3.8–5.2)
RBC # FLD: 12 % — SIGNIFICANT CHANGE UP (ref 10.3–14.5)
SODIUM SERPL-SCNC: 138 MMOL/L — SIGNIFICANT CHANGE UP (ref 135–145)
SPECIMEN SOURCE: SIGNIFICANT CHANGE UP
WBC # BLD: 7.65 K/UL — SIGNIFICANT CHANGE UP (ref 3.8–10.5)
WBC # FLD AUTO: 7.65 K/UL — SIGNIFICANT CHANGE UP (ref 3.8–10.5)

## 2020-10-09 PROCEDURE — 87186 SC STD MICRODIL/AGAR DIL: CPT

## 2020-10-09 PROCEDURE — 71045 X-RAY EXAM CHEST 1 VIEW: CPT

## 2020-10-09 PROCEDURE — 93005 ELECTROCARDIOGRAM TRACING: CPT

## 2020-10-09 PROCEDURE — 99285 EMERGENCY DEPT VISIT HI MDM: CPT | Mod: 25

## 2020-10-09 PROCEDURE — 85610 PROTHROMBIN TIME: CPT

## 2020-10-09 PROCEDURE — 86769 SARS-COV-2 COVID-19 ANTIBODY: CPT

## 2020-10-09 PROCEDURE — 85027 COMPLETE CBC AUTOMATED: CPT

## 2020-10-09 PROCEDURE — 96374 THER/PROPH/DIAG INJ IV PUSH: CPT

## 2020-10-09 PROCEDURE — 81001 URINALYSIS AUTO W/SCOPE: CPT

## 2020-10-09 PROCEDURE — 87635 SARS-COV-2 COVID-19 AMP PRB: CPT

## 2020-10-09 PROCEDURE — 83605 ASSAY OF LACTIC ACID: CPT

## 2020-10-09 PROCEDURE — 76770 US EXAM ABDO BACK WALL COMP: CPT

## 2020-10-09 PROCEDURE — 87086 URINE CULTURE/COLONY COUNT: CPT

## 2020-10-09 PROCEDURE — 36415 COLL VENOUS BLD VENIPUNCTURE: CPT

## 2020-10-09 PROCEDURE — 85730 THROMBOPLASTIN TIME PARTIAL: CPT

## 2020-10-09 PROCEDURE — 80048 BASIC METABOLIC PNL TOTAL CA: CPT

## 2020-10-09 PROCEDURE — 80061 LIPID PANEL: CPT

## 2020-10-09 PROCEDURE — 85025 COMPLETE CBC W/AUTO DIFF WBC: CPT

## 2020-10-09 PROCEDURE — 96375 TX/PRO/DX INJ NEW DRUG ADDON: CPT

## 2020-10-09 PROCEDURE — 87040 BLOOD CULTURE FOR BACTERIA: CPT

## 2020-10-09 PROCEDURE — 80053 COMPREHEN METABOLIC PANEL: CPT

## 2020-10-09 RX ORDER — LACTOBACILLUS ACIDOPHILUS 100MM CELL
1 CAPSULE ORAL
Qty: 22 | Refills: 0
Start: 2020-10-09 | End: 2020-10-19

## 2020-10-09 RX ORDER — LACTOBACILLUS ACIDOPHILUS 100MM CELL
1 CAPSULE ORAL
Qty: 0 | Refills: 0 | DISCHARGE

## 2020-10-09 RX ORDER — CEFDINIR 250 MG/5ML
1 POWDER, FOR SUSPENSION ORAL
Qty: 20 | Refills: 0
Start: 2020-10-09 | End: 2020-10-18

## 2020-10-09 RX ORDER — NICOTINE POLACRILEX 2 MG
1 GUM BUCCAL
Qty: 30 | Refills: 0
Start: 2020-10-09 | End: 2020-11-07

## 2020-10-09 RX ADMIN — Medication 1 DROP(S): at 17:18

## 2020-10-09 RX ADMIN — CEFTRIAXONE 100 MILLIGRAM(S): 500 INJECTION, POWDER, FOR SOLUTION INTRAMUSCULAR; INTRAVENOUS at 05:19

## 2020-10-09 RX ADMIN — Medication 81 MILLIGRAM(S): at 11:13

## 2020-10-09 RX ADMIN — Medication 250 MILLIGRAM(S): at 05:20

## 2020-10-09 RX ADMIN — Medication 1 TABLET(S): at 11:13

## 2020-10-09 RX ADMIN — Medication 1 DROP(S): at 05:20

## 2020-10-09 RX ADMIN — ONDANSETRON 4 MILLIGRAM(S): 8 TABLET, FILM COATED ORAL at 05:45

## 2020-10-09 RX ADMIN — Medication 250 MILLIGRAM(S): at 17:18

## 2020-10-09 NOTE — PROGRESS NOTE ADULT - PROBLEM SELECTOR PLAN 4
- continue with restasis - RESOLVED, patient reports no more episodes of diarrhea  - likely 2/2 to prolonged antibiotic use outpatient for multiple recurrent UTI ~ 6 weeks  - if diarrhea recurs, will send out for C Diff   - C diff not likely as patient reports no BM ~ 24 hours  - regular diet

## 2020-10-09 NOTE — DIETITIAN INITIAL EVALUATION ADULT. - OTHER INFO
66 yo Female patient with PMH of HLD, active smoker, C. diff (8 years ago) and recurrent UTI. Presents to the ED c/o diarrhea, admitted for hyponatremia and failed outpatient UTI.   Denies BM since admission, C. Diff not likely.  Pt tolerated breakfast, ate ~50%.  Endorses reduced po intake for few days PTA.  UBW ~128#.  Takes Culturelle probiotic at home and eats yogurt regularly.  Eats relatively healthy, limits red meat to 1x/week. Eats bran cereal qam, fruits/veg daily.

## 2020-10-09 NOTE — DIETITIAN INITIAL EVALUATION ADULT. - PROBLEM SELECTOR PLAN 6
UA large amount of blood   - likely 2/2 to failed outpatient UTI  - fu Kidney and bladder ultrasound  - hemodynamically stable, continue to monitor H&H  - consult urology, Dr. Kang

## 2020-10-09 NOTE — DISCHARGE NOTE PROVIDER - HOSPITAL COURSE
FROM ADMISSION H+P:   HPI:  69 yo F patient with PMH current smoker, HLD, C. diff. and recurrent UTI. Presents today to the ED c/o  2x nonbloody foul smelling diarrhea since Saturday. Pt had a UTI for the last 6 weeks that has been getting worse despite multiple abx therapy. Pt had been on Nitrofurantoin (October 5), ciprofloxacin (september 15) and Bactrim (September 30) and then recently restarted on Nitrofurantoin. Pt only took 2 doses of bactrim due to nausea so had to be started on nitrofurantoin again. Pt has been reporting worsening cramping pain in the lower abdomen on and off for about 6 weeks. She states that the pain improved when on Nitrofurantoin initially and cefitin, however the cramping kept returning. When patient was started on macrobid the second time she had 2 episodes of nonbloody foul smelling diarrhea on Saturday and sunday and 1 episode of bilious nonbloody vomiting on Sunday. She also admitted subjective fever X1  on Saturday. She states that she doesn't feel well overall. This prompted the patient to go to the ED because of her previous hx of C Diff (~8 years ago). Pt reports loss of appetite and has not been able to eat or drink much since Monday. Pt also reports of feeling dehydrated even though she has been enough drinking water. Pt had 1 episode of hematuria (3 weeks ago), as well as a general sensation of pressure, dysuria, and urgency on and off for the last 6 weeks.     Patient with recurrent UTI, was given Nitrofurantoin (October 5),ciprofloxacin (september 15) and Bactrim (September 30).      of note- pt does not have PCP    In the ED Vitals:   /65 HR 96 T 98.6 F SpO2 98% RA   Labs:   WBC: 11.46 with bands Neutrophil 9.54   H/H: 11.7/31.7  UA: grossly positive for nitrites, mod leuk esterase and large amount of blood, no white cells   CXR: no active pulmonary disease     Given 1.7 L NS bolus X 1  Started on IV Rocephin 1 g      (07 Oct 2020 13:50)      ---  HOSPITAL COURSE:   Patient was admitted for r/o C diff, failed outpatient UTI. She was started on IV Rocephin. Urine cultures grew Ecoli. Patient reported no additional episodes of diarrhea and C diff was deemed unlikely. Kidney and Bladder ultrasound showed no hydronephrosis. Diarrhea was determined to be 2/2 to multiple courses of antibiotics in the span of 6 weeks prior to admission. ID was consulted and recommended ______. Urology, Dr. Kang was consulted and recommended no  need for  intervention at this time. Patient was also found to be hyponatremic which improved with IV fluids and increased PO intakes. This was likely 2/2 to vomiting and diarrhea prior to admission. Patient was hemodynamically stable for discharge   ---  CONSULTANTS:   Urology: Dr. Kang  ID: Dr. Merlos       Vital Signs Last 24 Hrs  T(C): 36.6 (09 Oct 2020 05:19), Max: 36.8 (08 Oct 2020 12:10)  T(F): 97.8 (09 Oct 2020 05:19), Max: 98.3 (08 Oct 2020 12:10)  HR: 66 (09 Oct 2020 05:19) (66 - 75)  BP: 135/77 (09 Oct 2020 05:19) (125/68 - 140/65)  BP(mean): --  RR: 19 (09 Oct 2020 05:19) (16 - 19)  SpO2: 100% (09 Oct 2020 05:19) (95% - 100%)    LOS: 2d    VITALS:   T(C): 36.6 (10-09-20 @ 05:19), Max: 36.8 (10-08-20 @ 12:10)  HR: 66 (10-09-20 @ 05:19) (66 - 75)  BP: 135/77 (10-09-20 @ 05:19) (125/68 - 140/65)  RR: 19 (10-09-20 @ 05:19) (16 - 19)  SpO2: 100% (10-09-20 @ 05:19) (95% - 100%)    GENERAL: NAD, lying in bed comfortably  HEAD:  Atraumatic, Normocephalic  EYES: EOMI, PERRLA, conjunctiva and sclera clear  ENT: Moist mucous membranes  NECK: Supple, No JVD  CHEST/LUNG: Clear to auscultation bilaterally; No rales, rhonchi, wheezing, or rubs. Unlabored respirations  HEART: Regular rate and rhythm; No murmurs, rubs, or gallops  ABDOMEN: BSx4; Soft, nontender, nondistended  EXTREMITIES:  2+ Peripheral Pulses, brisk capillary refill. No clubbing, cyanosis, or edema  NERVOUS SYSTEM:  A&Ox3, no focal deficits   SKIN: No rashes or lesions  ---  TIME SPENT:  I, the attending physician, was physically present for the key portions of the evaluation and management (E/M) service provided. The total amount of time spent reviewing the hospital notes, laboratory values, imaging findings, assessing/counseling the patient, discussing with consultant physicians, social work, nursing staff was -- minutes    ---  Primary care provider was made aware of plan for discharge:      [  ] NO     [  ] YES   FROM ADMISSION H+P:   HPI:  71 yo F patient with PMH current smoker, HLD, C. diff. and recurrent UTI. Presents today to the ED c/o  2x nonbloody foul smelling diarrhea since Saturday. Pt had a UTI for the last 6 weeks that has been getting worse despite multiple abx therapy. Pt had been on Nitrofurantoin (October 5), ciprofloxacin (september 15) and Bactrim (September 30) and then recently restarted on Nitrofurantoin. Pt only took 2 doses of bactrim due to nausea so had to be started on nitrofurantoin again. Pt has been reporting worsening cramping pain in the lower abdomen on and off for about 6 weeks. She states that the pain improved when on Nitrofurantoin initially and cefitin, however the cramping kept returning. When patient was started on macrobid the second time she had 2 episodes of nonbloody foul smelling diarrhea on Saturday and sunday and 1 episode of bilious nonbloody vomiting on Sunday. She also admitted subjective fever X1  on Saturday. She states that she doesn't feel well overall. This prompted the patient to go to the ED because of her previous hx of C Diff (~8 years ago). Pt reports loss of appetite and has not been able to eat or drink much since Monday. Pt also reports of feeling dehydrated even though she has been enough drinking water. Pt had 1 episode of hematuria (3 weeks ago), as well as a general sensation of pressure, dysuria, and urgency on and off for the last 6 weeks.     Patient with recurrent UTI, was given Nitrofurantoin (October 5),ciprofloxacin (september 15) and Bactrim (September 30).      of note- pt does not have PCP    In the ED Vitals:   /65 HR 96 T 98.6 F SpO2 98% RA   Labs:   WBC: 11.46 with bands Neutrophil 9.54   H/H: 11.7/31.7  UA: grossly positive for nitrites, mod leuk esterase and large amount of blood, no white cells   CXR: no active pulmonary disease     Given 1.7 L NS bolus X 1  Started on IV Rocephin 1 g      (07 Oct 2020 13:50)      ---  HOSPITAL COURSE:   Patient was admitted for r/o C diff, failed outpatient UTI. She was started on IV Rocephin. Urine cultures grew Ecoli. Patient reported no additional episodes of diarrhea and C diff was deemed unlikely. Kidney and Bladder ultrasound showed no hydronephrosis. Diarrhea was determined to be 2/2 to multiple courses of antibiotics in the span of 6 weeks prior to admission. ID was consulted and recommended ______. Urology, Dr. Kang was consulted and recommended no  need for  intervention at this time. Patient was also found to be hyponatremic which improved with IV fluids and increased PO intakes. This was likely 2/2 to vomiting and diarrhea prior to admission. Patient was hemodynamically stable for discharge   ---  CONSULTANTS:   Urology: Dr. Kang  ID: Dr. Merlos       Vital Signs Last 24 Hrs  T(C): 36.6 (09 Oct 2020 05:19), Max: 36.8 (08 Oct 2020 12:10)  T(F): 97.8 (09 Oct 2020 05:19), Max: 98.3 (08 Oct 2020 12:10)  HR: 66 (09 Oct 2020 05:19) (66 - 75)  BP: 135/77 (09 Oct 2020 05:19) (125/68 - 140/65)  BP(mean): --  RR: 19 (09 Oct 2020 05:19) (16 - 19)  SpO2: 100% (09 Oct 2020 05:19) (95% - 100%)    LOS: 2d    VITALS:   T(C): 36.6 (10-09-20 @ 05:19), Max: 36.8 (10-08-20 @ 12:10)  HR: 66 (10-09-20 @ 05:19) (66 - 75)  BP: 135/77 (10-09-20 @ 05:19) (125/68 - 140/65)  RR: 19 (10-09-20 @ 05:19) (16 - 19)  SpO2: 100% (10-09-20 @ 05:19) (95% - 100%)    GENERAL: NAD, lying in bed comfortably  HEAD:  Atraumatic, Normocephalic  EYES: EOMI, PERRLA, conjunctiva and sclera clear  ENT: Moist mucous membranes  NECK: Supple, No JVD  CHEST/LUNG: Clear to auscultation bilaterally; No rales, rhonchi, wheezing, or rubs. Unlabored respirations  HEART: Regular rate and rhythm; No murmurs, rubs, or gallops  ABDOMEN: BSx4; Soft, nontender, nondistended  EXTREMITIES:  2+ Peripheral Pulses, brisk capillary refill. No clubbing, cyanosis, or edema  NERVOUS SYSTEM:  A&Ox3, no focal deficits   SKIN: No rashes or lesions  ---  TIME SPENT:  I, the attending physician, was physically present for the key portions of the evaluation and management (E/M) service provided. The total amount of time spent reviewing the hospital notes, laboratory values, imaging findings, assessing/counseling the patient, discussing with consultant physicians, social work, nursing staff was 40 minutes   FROM ADMISSION H+P:   HPI:  71 yo F patient with PMH current smoker, HLD, C. diff. and recurrent UTI. Presents today to the ED c/o  2x nonbloody foul smelling diarrhea since Saturday. Pt had a UTI for the last 6 weeks that has been getting worse despite multiple abx therapy. Pt had been on Nitrofurantoin (October 5), ciprofloxacin (september 15) and Bactrim (September 30) and then recently restarted on Nitrofurantoin. Pt only took 2 doses of bactrim due to nausea so had to be started on nitrofurantoin again. Pt has been reporting worsening cramping pain in the lower abdomen on and off for about 6 weeks. She states that the pain improved when on Nitrofurantoin initially and cefitin, however the cramping kept returning. When patient was started on macrobid the second time she had 2 episodes of nonbloody foul smelling diarrhea on Saturday and sunday and 1 episode of bilious nonbloody vomiting on Sunday. She also admitted subjective fever X1  on Saturday. She states that she doesn't feel well overall. This prompted the patient to go to the ED because of her previous hx of C Diff (~8 years ago). Pt reports loss of appetite and has not been able to eat or drink much since Monday. Pt also reports of feeling dehydrated even though she has been enough drinking water. Pt had 1 episode of hematuria (3 weeks ago), as well as a general sensation of pressure, dysuria, and urgency on and off for the last 6 weeks.     Patient with recurrent UTI, was given Nitrofurantoin (October 5),ciprofloxacin (september 15) and Bactrim (September 30).      of note- pt does not have PCP    In the ED Vitals:   /65 HR 96 T 98.6 F SpO2 98% RA   Labs:   WBC: 11.46 with bands Neutrophil 9.54   H/H: 11.7/31.7  UA: grossly positive for nitrites, mod leuk esterase and large amount of blood, no white cells   CXR: no active pulmonary disease     Given 1.7 L NS bolus X 1  Started on IV Rocephin 1 g      (07 Oct 2020 13:50)      ---  HOSPITAL COURSE:   Patient was admitted for r/o C diff, failed outpatient UTI. She was started on IV Rocephin. Urine cultures grew Ecoli. Patient reported no additional episodes of diarrhea and C diff was deemed unlikely. Kidney and Bladder ultrasound showed no hydronephrosis. Diarrhea was determined to be 2/2 to multiple courses of antibiotics in the span of 6 weeks prior to admission. ID  Dr Otra was consulted and recommended Dr ELVIS Merlos saw pt,likely Pyelonephritis /UTI . Urology, Dr. Kang was consulted and recommended no  need for  intervention at this time. Patient was also found to be hyponatremic 2/2 Hypovolemic Hypo NA with Poor PO intake  which improved with IV fluids and increased PO intakes. This was likely 2/2 to vomiting and diarrhea prior to admission. Pt was treated with IV Rocephin daily &  Urine c/s  showe E Coli , Abx changed to PO Cefdinir 300 mg 2x day for 11 more days  as per ID, Pt had NO BM During her stay in Hospital , Patient was hemodynamically stable for discharge home with Out pt follow up.  ---  CONSULTANTS:   Urology: Dr. Kang  ID: Dr. ELVIS Merlos /DR Orta       TIME SPENT:  I, the attending physician, was physically present for the key portions of the evaluation and management (E/M) service provided. The total amount of time spent reviewing the hospital notes, laboratory values, imaging findings, assessing/counseling the patient, discussing with consultant physicians, social work, nursing staff was 45 minutes

## 2020-10-09 NOTE — DISCHARGE NOTE PROVIDER - NSDCMRMEDTOKEN_GEN_ALL_CORE_FT
Acidophilus oral tablet: 1 tab(s) orally 2 times a day  aspirin 81 mg oral tablet: 1 tab(s) orally once a day  atorvastatin 20 mg oral tablet: 1 tab(s) orally once a day  Cranberry oral tablet: 1 tab(s) orally once a day  Glucosamine Chondroitin oral capsule: 1 cap(s) orally once a day  Multiple Vitamins oral tablet: 1 tab(s) orally once a day  Restasis 0.05% ophthalmic emulsion: 1 drop(s) to each affected eye every 12 hours  Vitamin C: 1 cap(s) orally once a day  vitamin E oral capsule: 1 cap(s) orally once a day   Acidophilus oral tablet: 1 tab(s) orally 2 times a day MDD:2  aspirin 81 mg oral tablet: 1 tab(s) orally once a day  atorvastatin 20 mg oral tablet: 1 tab(s) orally once a day  cefdinir 300 mg oral capsule: 1 cap(s) orally every 12 hours   Cranberry oral tablet: 1 tab(s) orally once a day  Glucosamine Chondroitin oral capsule: 1 cap(s) orally once a day  Multiple Vitamins oral tablet: 1 tab(s) orally once a day  Restasis 0.05% ophthalmic emulsion: 1 drop(s) to each affected eye every 12 hours  Vitamin C: 1 cap(s) orally once a day  vitamin E oral capsule: 1 cap(s) orally once a day   Acidophilus oral tablet: 1 tab(s) orally 2 times a day MDD:2  aspirin 81 mg oral tablet: 1 tab(s) orally once a day  atorvastatin 20 mg oral tablet: 1 tab(s) orally once a day  cefdinir 300 mg oral capsule: 1 cap(s) orally every 12 hours   Cranberry oral tablet: 1 tab(s) orally once a day  Glucosamine Chondroitin oral capsule: 1 cap(s) orally once a day  Multiple Vitamins oral tablet: 1 tab(s) orally once a day  nicotine 7 mg/24 hr transdermal film, extended release: 1 patch transdermal once a day MDD:1  Restasis 0.05% ophthalmic emulsion: 1 drop(s) to each affected eye every 12 hours  Vitamin C: 1 cap(s) orally once a day  vitamin E oral capsule: 1 cap(s) orally once a day

## 2020-10-09 NOTE — DISCHARGE NOTE NURSING/CASE MANAGEMENT/SOCIAL WORK - PATIENT PORTAL LINK FT
You can access the FollowMyHealth Patient Portal offered by [facility] by registering at the following website: [email or web address]. By joining [facility] FollowMyHealth portal, you will also be able to view your health information using other applications (apps) compatible with our system.

## 2020-10-09 NOTE — DISCHARGE NOTE PROVIDER - CARE PROVIDER_API CALL
Ashish Kang  UROLOGY  [address], [address]  [place] [address]  Phone: [phone]  Fax: [phone]  Established Patient  Follow Up Time: 2 weeks    Esa Hurtado  [address], [address]  [place] [address]  Phone: [phone]  Fax: [phone]  Follow Up Time: 2 weeks   Ashish Kang  UROLOGY  [address], [address]  [place] [address]  Phone: [phone]  Fax: [phone]  Established Patient  Follow Up Time: 2 weeks    Cyrus Leonard  INTERNAL MEDICINE  [address] [place] [address]  Phone: [phone]  Fax: [phone]  Follow Up Time:

## 2020-10-09 NOTE — PROGRESS NOTE ADULT - SUBJECTIVE AND OBJECTIVE BOX
[facility], Division of Infectious Diseases  BELEN Duenas Y. Patel, S. Shah  [phone]    Name: ANGELA BECK  Age: 65y  Gender: Female  MRN: [id number]  Note Date: 10-09-20    Interval History:  Patient seen and examined at bedside. No acute overnight events.   Afebrile. No complaints.  Notes reviewed    Antibiotics:  cefTRIAXone   IVPB 1000 milliGRAM(s) IV Intermittent every 24 hours      Medications:  artificial  tears Solution 1 Drop(s) Both EYES two times a day  aspirin enteric coated 81 milliGRAM(s) Oral daily  atorvastatin 20 milliGRAM(s) Oral at bedtime  cefTRIAXone   IVPB 1000 milliGRAM(s) IV Intermittent every 24 hours  enoxaparin Injectable 40 milliGRAM(s) SubCutaneous daily  influenza   Vaccine 0.5 milliLiter(s) IntraMuscular once  multivitamin 1 Tablet(s) Oral daily  nicotine   7 mG/24 Hr(s) Transdermal Patch -  Peds 1 Patch Transdermal daily  ondansetron Injectable 4 milliGRAM(s) IV Push every 6 hours PRN  saccharomyces boulardii 250 milliGRAM(s) Oral two times a day      Review of Systems:  A 10-point review of systems was obtained.     Pertinent positives and negatives--  Constitutional: No fevers. No Chills. No Rigors.   Cardiovascular: No chest pain. No palpitations.  Respiratory: No shortness of breath. No cough.  Gastrointestinal: No nausea or vomiting. No diarrhea or constipation.   Psychiatric: Pleasant. Appropriate affect.    Review of systems otherwise negative except as previously noted.    Allergies: No Known Allergies    For details regarding the patient's past medical history, social history, family history, and other miscellaneous elements, please refer the initial infectious diseases consultation and/or the admitting history and physical examination for this admission.    Objective:  Vitals:   T(C): 36.6 (10-09-20 @ 05:19), Max: 36.8 (10-08-20 @ 12:10)  HR: 66 (10-09-20 @ 05:19) (66 - 75)  BP: 135/77 (10-09-20 @ 05:19) (125/68 - 140/65)  RR: 19 (10-09-20 @ 05:19) (16 - 19)  SpO2: 100% (10-09-20 @ 05:19) (95% - 100%)    Physical Examination:  General: no acute distress  HEENT: NC/AT, EOMI, anicteric, no oral lesions  Neck: supple, no palpable LAD  Cardio: S1, S2 heard, RRR, no murmurs  Resp: breath sounds heard bilaterally, no rales, wheezes or rhonchi  Abd: soft, NT, ND, + bowel sounds. No CVA tenderness  Neuro: AAOx3, no obvious focal deficits  Ext: no edema or cyanosis  Skin: warm, dry, no visible rash      Laboratory Studies:  CBC:                       11.6   7.65  )-----------( 286      ( 09 Oct 2020 07:19 )             33.8     CMP: 10-09    138  |  102  |  9   ----------------------------<  93  4.4   |  29  |  0.78    Ca    8.8      09 Oct 2020 07:19    TPro  7.4  /  Alb  3.0<L>  /  TBili  0.3  /  DBili  x   /  AST  27  /  ALT  34  /  AlkPhos  94  10-08    LIVER FUNCTIONS - ( 08 Oct 2020 07:45 )  Alb: 3.0 g/dL / Pro: 7.4 g/dL / ALK PHOS: 94 U/L / ALT: 34 U/L / AST: 27 U/L / GGT: x             Microbiology:    Culture - Urine (collected 10-07-20 @ 15:17)  Source: .Urine Clean Catch (Midstream)  Preliminary Report (10-08-20 @ 22:41):    >100,000 CFU/ml Escherichia coli    Culture - Blood (collected 10-07-20 @ 15:13)  Source: .Blood Blood-Peripheral  Preliminary Report (10-08-20 @ 16:01):    No growth to date.    Culture - Blood (collected 10-07-20 @ 15:13)  Source: .Blood Blood-Peripheral  Preliminary Report (10-08-20 @ 16:01):    No growth to date.      Radiology:

## 2020-10-09 NOTE — DISCHARGE NOTE PROVIDER - NSDCCPCAREPLAN_GEN_ALL_CORE_FT
PRINCIPAL DISCHARGE DIAGNOSIS  Diagnosis: UTI (urinary tract infection)  Assessment and Plan of Treatment: You were found to have a UTI. We treated you with antibiotics in the hospital. Your condition improved.      SECONDARY DISCHARGE DIAGNOSES  Diagnosis: Diarrhea  Assessment and Plan of Treatment: Diarrhea  You presented with multiple episodes of diarrhea. We determiend this was likely secondary to multiple antibiotic use.   Continue to take your probiotics at home.    Diagnosis: Acute hyponatremia  Assessment and Plan of Treatment: You were found to have low sodium upon admission. This was likely due to diarrhea and vomiting at home. We treated you in the hospital with IV fluids and your sodium improved.    Diagnosis: Current tobacco use  Assessment and Plan of Treatment: Current tobacco use: Please consider cutting down or quitting smoking as smoking increases the risk of cancer and underlying lung diseases likey emphysema. We offered you nicotine patches in the hospital. Please follow up with a primary care physician upon discharge to discuss smoking cessation strategies.    Diagnosis: Dry eyes  Assessment and Plan of Treatment: Dry eyes  Continue your restasis as needed.    Diagnosis: Carotid stenosis  Assessment and Plan of Treatment: Carotid stenosis  continue to take your aspirin and atorvastatin at home.     PRINCIPAL DISCHARGE DIAGNOSIS  Diagnosis: UTI (urinary tract infection)  Assessment and Plan of Treatment: You were found to have a UTI. /Pyelonephritis   -You were  treated  with  IV antibiotics in the hospital.   -Continue Cefdinir 300 mg 2x day x 11 days  -Continue Bacid 1 tab 2x day for 2 weeks   -Follow up with DR Kang in 3-4 weeks  OR with any urinary complaints      SECONDARY DISCHARGE DIAGNOSES  Diagnosis: Carotid stenosis  Assessment and Plan of Treatment: Carotid stenosis  continue to take your aspirin and atorvastatin at home.    Diagnosis: Current tobacco use  Assessment and Plan of Treatment: Current tobacco use: Please consider cutting down or quitting smoking as smoking increases the risk of many cancers , Cardiovascular diseases, stroke, and underlying lung diseases likey COPD/ emphysema.  - We offered you nicotine patches in the hospital.  -Nicotine Patch daily to continue , NO SMOKING while on Patch treatment at any point & time.  - Please follow up with a primary care physician upon discharge to discuss smoking cessation strategies.    Diagnosis: Acute hyponatremia  Assessment and Plan of Treatment: You were found to have low sodium upon admission.   This was likely due to, Poor Oral intake , diarrhea and vomiting at home. We treated you in the hospital with IV fluids and your sodium improved.    Diagnosis: Dry eyes  Assessment and Plan of Treatment: Dry eyes  Continue your restasis as needed.    Diagnosis: Diarrhea  Assessment and Plan of Treatment: Diarrhea-RESOLVED ,NO BM in Hospital  You presented with multiple episodes of diarrhea at home .   Continue to take your probiotics at home.

## 2020-10-09 NOTE — PROGRESS NOTE ADULT - PROBLEM SELECTOR PLAN 5
- active smoker  - offered nicotine patch, refusing  - counseled on smoking cessation - continue with restasis

## 2020-10-09 NOTE — PROGRESS NOTE ADULT - ASSESSMENT
Pt is a 70W w/ PMHx HLD, hx of C. diff and recurrent UTIs who is p/w diarrhea x4 days. Mild leukocytosis w/ +UA. Overall picture concerning for persistent UTI failed outpatient therapy, additional concern for possible C. diff given hx and recent prolonged Abx use however pt w/o diarrhea at this time.     UTI failed outpatient therapy  -BCx NGTD  -UCx E.coli, awaiting sensitivities  -c/w ceftriaxone for now, will await above cultures to help guide therapy    Diarrhea, abdominal pain  -Initial concern for C. diff but diarrhea resolved  -no need to send sample at this time, however if diarrhea recurs, would send for C. diff and stool GI PCR    Hyponatremia  resolved Pt is a 70W w/ PMHx HLD, hx of C. diff and recurrent UTIs who is p/w diarrhea x4 days. Mild leukocytosis w/ +UA. Overall picture concerning for persistent UTI failed outpatient therapy, additional concern for possible C. diff given hx and recent prolonged Abx use however pt w/o diarrhea at this time.     UTI/Pyelonephritis failed outpatient therapy  -BCx NGTD  -UCx E.coli, sensitive to cephalosporins  -c/w ceftriaxone while inpatient  When ready for discharge can switch to cefdinir 300mg BID to complete total 14 day course until 10/19/2020    Diarrhea, abdominal pain  Resolved on admission    Hyponatremia  resolved

## 2020-10-09 NOTE — PROGRESS NOTE ADULT - PROBLEM SELECTOR PLAN 3
- Resolved  - Na 138 this morning  - continue to encourage PO intake  - patient reports good appetite - Resolved  - Na 138 this morning  - continue to encourage PO intake  - regular diet   - patient reports good appetite - active smoker  - offered nicotine patch, refusing  - counseled on smoking cessation, d/w pt, smoking cessation

## 2020-10-09 NOTE — CONSULT NOTE ADULT - ASSESSMENT
71 yo F patient with PMH current smoker, HLD, C. diff. and recurrent UTI. Admitted w/ 2x nonbloody foul smelling diarrhea  and hyponatremia. Pt had a UTI for the last 6 weeks that has been getting worse despite multiple abx therapy. Ucx 9/30/20 >100k EC res amp/tetra/sulfa. Pt had been on Nitrofurantoin (October 5), ciprofloxacin (september 15) and Bactrim (September 30). SInce admission pt feeling much improved. Diarrhea has stopped. Na returned to nl. Ucx >100k EC; sens pending.      Agree w/ antibx for EC UTI; adjust per final sensitivities.  No indication for  intervention at this time.

## 2020-10-09 NOTE — DISCHARGE NOTE PROVIDER - NSDCFUADDINST_GEN_ALL_CORE_FT
Follow up with DR Kang in 3-4 weeks  Follow up with DR Orta ID in 2-3 weeks   Follow up with New PMD -Info given Repeat CBC, CMP in 1-2 weeks with DR ARTIS Leonard

## 2020-10-09 NOTE — PROGRESS NOTE ADULT - ATTENDING COMMENTS
Infectious Diseases will continue to follow.   Please call with any questions.   Kristy Merlos M.D.  [facility], Division of Infectious Diseases [phone]  For over the weekend and after hours, please call [phone]  Dr. Doris Negrete covering this weekend, I will resume service on Monday 10/12
Pt seen, examined, case & Care plan d/w pt, residents at detail.  AM labs   D/C Plan in AM
Pt seen, examined, case & care plan d/w pt ,residents at detail.  D/W ID -DR ELVIS Merlos at detail.  D/C Nate with PO Abx   Total d/c care time is 45 minutes.

## 2020-10-09 NOTE — DIETITIAN INITIAL EVALUATION ADULT. - PROBLEM SELECTOR PLAN 2
Pt with history of recurrent UTI failed outpatient with multiple antibiotics( Nitrofurantoin, Bactrim, Ciprofloxacin)   -Afebrile, leukocytosis 11k   -UA shows +nitrate, +LE, +blood.   -s/p Rocephin 1 g in the ED , continue rocephin  -Blood and urine culture f/up results  -Ordered Bladder and Renal US, f/up results.   -ID consulted, F/up recs.

## 2020-10-09 NOTE — PROGRESS NOTE ADULT - PROBLEM SELECTOR PLAN 1
- RESOLVED, patient reports no more episodes of diarrhea  - likely 2/2 to prolonged antibiotic use outpatient for multiple recurrent UTI ~ 6 weeks  - if diarrhea recurs, will send out for C Diff   - C diff not likely as patient reports no BM ~ 24 hours - RESOLVED, patient reports no more episodes of diarrhea  - likely 2/2 to prolonged antibiotic use outpatient for multiple recurrent UTI ~ 6 weeks  - if diarrhea recurs, will send out for C Diff   - C diff not likely as patient reports no BM ~ 24 hours  - regular diet - Found to have some mild bladder wall thickening on CT A/P, no pyelonephritis likely cystitis picture; also had dysuria and hematuria  - continue with Rocephin 1 gm daily  - Cultures 9/30 grew E.Coli  > 100 K; urology Dr. Kang; will wait for sensitivities pending discharge    - otherwise afebrile and asymptomatic today

## 2020-10-09 NOTE — DISCHARGE NOTE PROVIDER - PROVIDER TOKENS
PROVIDER:[TOKEN:[[id number]],FOLLOWUP:[2 weeks],ESTABLISHEDPATIENT:[T]],PROVIDER:[TOKEN:[[id number]],FOLLOWUP:[2 weeks]] PROVIDER:[TOKEN:[[id number]],FOLLOWUP:[2 weeks],ESTABLISHEDPATIENT:[T]],PROVIDER:[TOKEN:[[id number]]]

## 2020-10-09 NOTE — PROGRESS NOTE ADULT - PROBLEM SELECTOR PLAN 6
Previously dx carotid stenosis of L carotid artery as per patient  - cw ASA 81 and lipitor 20 mg QHC

## 2020-10-09 NOTE — DISCHARGE NOTE PROVIDER - CARE PROVIDERS DIRECT ADDRESSES
[email or web address]

## 2020-10-09 NOTE — DIETITIAN INITIAL EVALUATION ADULT. - PROBLEM SELECTOR PLAN 3
history of C diff (~8 yr ago); 2x episodes of foul smelling diarrhea  - likely 2/2 to antibiotic use   - C diff ordered will follow up  - O/P stool ordered will follow up   - florastor probiotics

## 2020-10-09 NOTE — PROGRESS NOTE ADULT - PROBLEM SELECTOR PLAN 2
- Found to have some mild bladder wall thickening on CT A/P, no pyelonephritis likely cystitis picture; also had dysuria and hematuria  - continue with rocephin  - Cultures 9/30 grew EC > 100 K (outpatient) as per urology Dr. Kang; will wait for sensitivities pending discharge    - otherwise afebrile and asymptomatic today - Found to have some mild bladder wall thickening on CT A/P, no pyelonephritis likely cystitis picture; also had dysuria and hematuria  - continue with rocephin  - Cultures 9/30 grew EC > 100; urology Dr. Kang; will wait for sensitivities pending discharge    - otherwise afebrile and asymptomatic today - Resolved  - Na 138 this morning  - continue to encourage PO intake  - regular diet   - patient reports good appetite

## 2020-10-09 NOTE — DISCHARGE NOTE PROVIDER - NSDCACTIVITY_GEN_ALL_CORE
No restrictions Bathing allowed/No restrictions/Walking - Indoors allowed/No heavy lifting/straining/Showering allowed/Walking - Outdoors allowed

## 2020-10-09 NOTE — PROGRESS NOTE ADULT - ASSESSMENT
66 yo Female patient with PMH of HLD, active smoker, C. diff (8 years ago) and recurrent UTI. Presents to the ED c/o diarrhea, admitted for hyponatremia and failed outpatient UTI.

## 2020-10-09 NOTE — DIETITIAN INITIAL EVALUATION ADULT. - PROBLEM SELECTOR PLAN 4
- 50 pack/year smoking history, current smoker of 3-4 cigarettes a day  - patient counseled on the risks of smoking including heart disease and cancer, offered nicotine patch to assist with smoking cessation and patient accepted

## 2020-10-09 NOTE — PROGRESS NOTE ADULT - SUBJECTIVE AND OBJECTIVE BOX
Patient is a 65y old  Female who presents with a chief complaint of Hyponatremia, recurrent UTI (09 Oct 2020 08:00)      INTERVAL HPI: 71 yo F patient with PMH current smoker, HLD, C. diff. and recurrent UTI. Presents today to the ED c/o  2x nonbloody foul smelling diarrhea since Saturday. Pt had a UTI for the last 6 weeks that has been getting worse despite multiple abx therapy. Pt had been on Nitrofurantoin (), ciprofloxacin (september 15) and Bactrim () and then recently restarted on Nitrofurantoin. Pt only took 2 doses of bactrim due to nausea so had to be started on nitrofurantoin again. Pt has been reporting worsening cramping pain in the lower abdomen on and off for about 6 weeks. She states that the pain improved when on Nitrofurantoin initially and cefitin, however the cramping kept returning. When patient was started on macrobid the second time she had 2 episodes of nonbloody foul smelling diarrhea on Saturday and  and 1 episode of bilious nonbloody vomiting on . She also admitted subjective fever X1  on Saturday. She states that she doesn't feel well overall. This prompted the patient to go to the ED because of her previous hx of C Diff (~8 years ago). Pt reports loss of appetite and has not been able to eat or drink much since Monday. Pt also reports of feeling dehydrated even though she has been enough drinking water. Pt had 1 episode of hematuria (3 weeks ago), as well as a general sensation of pressure, dysuria, and urgency on and off for the last 6 weeks.     Patient with recurrent UTI, was given Nitrofurantoin (),ciprofloxacin (september 15) and Bactrim ().      of note- pt does not have PCP    In the ED Vitals:   /65 HR 96 T 98.6 F SpO2 98% RA   Labs:   WBC: 11.46 with bands Neutrophil 9.54   H/H: 11.7/31.7  UA: grossly positive for nitrites, mod leuk esterase and large amount of blood, no white cells   CXR: no active pulmonary disease     Given 1.7 L NS bolus X 1  Started on IV Rocephin 1 g     10/8/2020: Patient and examined at bedside. reports a mild cough and runny nose. No fever. says it is likely from allergies and usually takes flonase. Offered to flonase, however she refused. denies any additional episodes of diarrhea and states her nausea has improved. Has not had a BM since admission. denies cp, palpitations, sob, abdominal pain, n/v/d/c.     - no BM C diff unlikely since patient has not had any BM   - kidney and bladder ultrasound: urinary bladder: Mild wall thickening. Volume 99 mL.No hydronephrosis. Pt seen By ID- Dr bangura     10/2/2020: Patient seen and examined at bedside. Reports no BM, no more episodes of diarrhea. No fever. No cough this morning. Denies cp, palpitations, sob, abdominal pain, dysuria, hematuria, n/v/d/c.    OVERNIGHT EVENTS: No acute overnight events.     Home Medications:  Acidophilus oral tablet: 1 tab(s) orally 2 times a day (07 Oct 2020 13:00)  aspirin 81 mg oral tablet: 1 tab(s) orally once a day (07 Oct 2020 13:00)  atorvastatin 20 mg oral tablet: 1 tab(s) orally once a day (07 Oct 2020 13:00)  Cranberry oral tablet: 1 tab(s) orally once a day (07 Oct 2020 13:00)  Dry Eye Relief ophthalmic solution: 1 drop(s) to each affected eye 4 times a day, As Needed (07 Oct 2020 13:00)  Glucosamine Chondroitin oral capsule: 1 cap(s) orally once a day (07 Oct 2020 13:00)  Multiple Vitamins oral tablet: 1 tab(s) orally once a day (07 Oct 2020 13:00)  Restasis 0.05% ophthalmic emulsion: 1 drop(s) to each affected eye every 12 hours (07 Oct 2020 13:00)  Vitamin C: 1 cap(s) orally once a day (07 Oct 2020 13:00)  vitamin E oral capsule: 1 cap(s) orally once a day (07 Oct 2020 13:00)      MEDICATIONS  (STANDING):  artificial  tears Solution 1 Drop(s) Both EYES two times a day  aspirin enteric coated 81 milliGRAM(s) Oral daily  atorvastatin 20 milliGRAM(s) Oral at bedtime  cefTRIAXone   IVPB 1000 milliGRAM(s) IV Intermittent every 24 hours  enoxaparin Injectable 40 milliGRAM(s) SubCutaneous daily  influenza   Vaccine 0.5 milliLiter(s) IntraMuscular once  multivitamin 1 Tablet(s) Oral daily  nicotine   7 mG/24 Hr(s) Transdermal Patch -  Peds 1 Patch Transdermal daily  saccharomyces boulardii 250 milliGRAM(s) Oral two times a day    MEDICATIONS  (PRN):  ondansetron Injectable 4 milliGRAM(s) IV Push every 6 hours PRN Nausea      No Known Allergies      Social History:  Alcohol: socially   Tobacco: 50 pk/years   Drug: N/A  Ambulate: Pt is able to ambulate independently  Occupation: Retired (previously worked in sales and finance) (07 Oct 2020 13:50)      REVIEW OF SYSTEMS:  CONSTITUTIONAL: No fever, No chills, No fatigue, No myalgia, No Body ache, No Weakness  EYES: No eye pain,  No visual disturbances, No discharge, No Redness  ENMT: No ear pain, No nose bleed, No vertigo; No sinus pain, No throat pain, No Congestion  NECK: No pain, No stiffness  RESPIRATORY: No cough, No wheezing, No hemoptysis, No shortness of breath  CARDIOVASCULAR: No chest pain, No palpitations  GASTROINTESTINAL: No abdominal pain, No epigastric pain. No nausea, No vomiting, No diarrhea, No constipation; [  ] BM  GENITOURINARY: No dysuria, No frequency, No urgency, No hematuria, No incontinence  NEUROLOGICAL: No headaches, No dizziness, No numbness, No tingling, No tremors, No weakness  EXTREMITIES: No Swelling, No Pain, No Edema  SKIN: [ x ] No itching, burning, rashes, or lesions   MUSCULOSKELETAL: No joint pain, No joint swelling; No muscle pain, No back pain, No extremity pain  PSYCHIATRIC: No depression, No anxiety, No mood swings, No difficulty sleeping at night  PAIN SCALE: [ x ] None  [  ] Other-  ROS Unable to obtain due to: [  ] Dementia  [  ] Lethargy  [  ] Sedated  [  ] Non verbal  REST OF REVIEW OF SYSTEMS: [x  ] Normal     Vital Signs Last 24 Hrs  T(C): 36.6 (09 Oct 2020 05:19), Max: 36.8 (08 Oct 2020 12:10)  T(F): 97.8 (09 Oct 2020 05:19), Max: 98.3 (08 Oct 2020 12:10)  HR: 66 (09 Oct 2020 05:19) (66 - 75)  BP: 135/77 (09 Oct 2020 05:19) (125/68 - 140/65)  BP(mean): --  RR: 19 (09 Oct 2020 05:19) (16 - 19)  SpO2: 100% (09 Oct 2020 05:19) (95% - 100%)    CAPILLARY BLOOD GLUCOSE          I&O's Summary    PHYSICAL EXAM:  GENERAL:  [ x ] NAD, [x] Well appearing, [  ] Agitated, [  ] Drowsy, [  ] Lethargy, [  ] Confused   HEAD:  [ x ] Normal, [  ] Other  EYES:  [ x ] EOMI, [ x ] PERRLA, [x  ] Conjunctiva and sclera clear normal, [  ] Other, [  ] Pallor, [  ] Discharge  ENMT:  [ x ] Normal, [ x ] Moist mucous membranes, [x  ] Good dentition, [ x ] No thrush  NECK:  [ x ] Supple, [ x ] No JVD, [ x ] Normal thyroid, [  ] Lymphadenopathy, [  ] Other  CHEST/LUNG:  [ x ] Clear to auscultation bilaterally, [  ] Breath Sounds equal B/L / decreased, [  ] Poor effort, [ x ] No rales, [x  ] No rhonchi, [ x ] No wheezing  HEART:  [ x ] Regular rate and rhythm, [  ] Tachycardia, [  ] Bradycardia, [  ] Irregular, [ x ] No murmurs, No rubs, No gallops, [  ] PPM in place (Mfr:  )  ABDOMEN:  [ x ] Soft, [ x ] Nontender, [ x ] Nondistended, [x  ] No mass, [ x ] Bowel sounds present, [  ] Obese  NERVOUS SYSTEM:  [x  ] Alert & Oriented x3, [  ] Nonfocal, [  ] Confusion, [  ] Encephalopathic, [  ] Sedated, [  ] Unable to assess, [  ] Dementia, [  ] Other-  EXTREMITIES:  [x  ] 2+ Peripheral Pulses, No clubbing, No cyanosis,  [  ] Edema B/L lower EXT, [  ] PVD stasis skin changes B/L lower EXT, [  ] Wound  LYMPH:  No lymphadenopathy noted  SKIN:  [ x ] No rashes or lesions, [  ] Pressure ulcers, [  ] Ecchymosis, [  ] Skin tears, [  ] Other    DIET: Diet, Regular (10-07-20 @ 15:47)      LABS:                        11.6   7.65  )-----------( 286      ( 09 Oct 2020 07:19 )             33.8     09 Oct 2020 07:19    138    |  102    |  9      ----------------------------<  93     4.4     |  29     |  0.78     Ca    8.8        09 Oct 2020 07:19      PT/INR - ( 07 Oct 2020 11:39 )   PT: 14.0 sec;   INR: 1.21 ratio         PTT - ( 07 Oct 2020 11:39 )  PTT:28.4 sec  Urinalysis Basic - ( 07 Oct 2020 11:00 )    Color: Yellow / Appearance: Turbid / S.010 / pH: x  Gluc: x / Ketone: Small  / Bili: Negative / Urobili: 1   Blood: x / Protein: 100 / Nitrite: Positive   Leuk Esterase: Moderate / RBC: 6-10 /HPF / WBC TNTC /HPF   Sq Epi: x / Non Sq Epi: Few / Bacteria: TNTC      Culture Results:   >100,000 CFU/ml Escherichia coli (10-07 @ 15:17)  Culture Results:   No growth to date. (10-07 @ 15:13)  Culture Results:   No growth to date. (10-07 @ 15:13)    culture blood  -- .Urine Clean Catch (Midstream) 10-07 @ 15:17    culture urine  --  10-07 @ 15:17  culture blood  -- .Blood Blood-Peripheral 10-07 @ 15:13    culture urine  --  10-07 @ 15:13          Culture - Urine (collected 07 Oct 2020 15:17)  Source: .Urine Clean Catch (Midstream)  Preliminary Report (08 Oct 2020 22:41):    >100,000 CFU/ml Escherichia coli    Culture - Blood (collected 07 Oct 2020 15:13)  Source: .Blood Blood-Peripheral  Preliminary Report (08 Oct 2020 16:01):    No growth to date.    Culture - Blood (collected 07 Oct 2020 15:13)  Source: .Blood Blood-Peripheral  Preliminary Report (08 Oct 2020 16:01):    No growth to date.       Anemia Panel:      Thyroid Panel:                RADIOLOGY & ADDITIONAL TESTS:      HEALTH ISSUES - PROBLEM Dx:  Chronic back pain  Chronic back pain    Dry eyes  Dry eyes    Carotid stenosis  Carotid stenosis    Diarrhea  Diarrhea    Need for prophylactic measure  Need for prophylactic measure    Hematuria, unspecified type  Hematuria, unspecified type    Current tobacco use  Current tobacco use    UTI (urinary tract infection)  UTI (urinary tract infection)    Acute hyponatremia  Acute hyponatremia          Consultant(s) Notes Reviewed:  [  ] YES     Care Discussed with [ x ] Consultants, [  ] Patient, [  ] Family, [  ] HCP, [  ] , [  ] Social Service, [  ] RN, [  ] Physical Therapy, [  ] Palliative Care Team  DVT PPX: [ x ] Lovenox, [  ] SC Heparin, [  ] Coumadin, [  ] Xarelto, [  ] Eliquis, [  ] Pradaxa, [  ] IV Heparin drip, [  ] SCD, [  ] Ambulation, [  ] Contraindicated 2/2 GI Bleed, [  ] Contraindicated 2/2  Bleed, [  ] Contraindicated 2/2 Brain Bleed  Advanced Directive: [  ] None, [  ] DNR/DNI Patient is a 65y old  Female who presents with a chief complaint of Hyponatremia, recurrent UTI (09 Oct 2020 08:00)      INTERVAL HPI: 69 yo F patient with PMH current smoker, HLD, C. diff. and recurrent UTI. Presents today to the ED c/o  2x nonbloody foul smelling diarrhea since Saturday. Pt had a UTI for the last 6 weeks that has been getting worse despite multiple abx therapy. Pt had been on Nitrofurantoin (), ciprofloxacin (september 15) and Bactrim () and then recently restarted on Nitrofurantoin. Pt only took 2 doses of bactrim due to nausea so had to be started on nitrofurantoin again. Pt has been reporting worsening cramping pain in the lower abdomen on and off for about 6 weeks. She states that the pain improved when on Nitrofurantoin initially and cefitin, however the cramping kept returning. When patient was started on macrobid the second time she had 2 episodes of nonbloody foul smelling diarrhea on Saturday and  and 1 episode of bilious nonbloody vomiting on . She also admitted subjective fever X1  on Saturday. She states that she doesn't feel well overall. This prompted the patient to go to the ED because of her previous hx of C Diff (~8 years ago). Pt reports loss of appetite and has not been able to eat or drink much since Monday. Pt also reports of feeling dehydrated even though she has been enough drinking water. Pt had 1 episode of hematuria (3 weeks ago), as well as a general sensation of pressure, dysuria, and urgency on and off for the last 6 weeks.     Patient with recurrent UTI, was given Nitrofurantoin (),ciprofloxacin (september 15) and Bactrim ().      of note- pt does not have PCP    In the ED Vitals:   /65 HR 96 T 98.6 F SpO2 98% RA   Labs:   WBC: 11.46 with bands Neutrophil 9.54   H/H: 11.7/31.7  UA: grossly positive for nitrites, mod leuk esterase and large amount of blood, no white cells   CXR: no active pulmonary disease     Given 1.7 L NS bolus X 1  Started on IV Rocephin 1 g     10/8/2020: Patient and examined at bedside. reports a mild cough and runny nose. No fever. says it is likely from allergies and usually takes flonase. Offered to flonase, however she refused. denies any additional episodes of diarrhea and states her nausea has improved. Has not had a BM since admission. denies cp, palpitations, sob, abdominal pain, n/v/d/c.     - no BM C diff unlikely since patient has not had any BM   - kidney and bladder ultrasound: urinary bladder: Mild wall thickening. Volume 99 mL.No hydronephrosis. Pt seen By ID- Dr bangura     10/7/2020: Patient seen and examined at bedside. Reports no BM, no more episodes of diarrhea. No fever. No cough this morning. Denies cp, palpitations, sob, abdominal pain, dysuria, hematuria, n/v/d/c. plan for dc today     OVERNIGHT EVENTS: No acute overnight events.     Home Medications:  Acidophilus oral tablet: 1 tab(s) orally 2 times a day (07 Oct 2020 13:00)  aspirin 81 mg oral tablet: 1 tab(s) orally once a day (07 Oct 2020 13:00)  atorvastatin 20 mg oral tablet: 1 tab(s) orally once a day (07 Oct 2020 13:00)  Cranberry oral tablet: 1 tab(s) orally once a day (07 Oct 2020 13:00)  Dry Eye Relief ophthalmic solution: 1 drop(s) to each affected eye 4 times a day, As Needed (07 Oct 2020 13:00)  Glucosamine Chondroitin oral capsule: 1 cap(s) orally once a day (07 Oct 2020 13:00)  Multiple Vitamins oral tablet: 1 tab(s) orally once a day (07 Oct 2020 13:00)  Restasis 0.05% ophthalmic emulsion: 1 drop(s) to each affected eye every 12 hours (07 Oct 2020 13:00)  Vitamin C: 1 cap(s) orally once a day (07 Oct 2020 13:00)  vitamin E oral capsule: 1 cap(s) orally once a day (07 Oct 2020 13:00)      MEDICATIONS  (STANDING):  artificial  tears Solution 1 Drop(s) Both EYES two times a day  aspirin enteric coated 81 milliGRAM(s) Oral daily  atorvastatin 20 milliGRAM(s) Oral at bedtime  cefTRIAXone   IVPB 1000 milliGRAM(s) IV Intermittent every 24 hours  enoxaparin Injectable 40 milliGRAM(s) SubCutaneous daily  influenza   Vaccine 0.5 milliLiter(s) IntraMuscular once  multivitamin 1 Tablet(s) Oral daily  nicotine   7 mG/24 Hr(s) Transdermal Patch -  Peds 1 Patch Transdermal daily  saccharomyces boulardii 250 milliGRAM(s) Oral two times a day    MEDICATIONS  (PRN):  ondansetron Injectable 4 milliGRAM(s) IV Push every 6 hours PRN Nausea      No Known Allergies      Social History:  Alcohol: socially   Tobacco: 50 pk/years   Drug: N/A  Ambulate: Pt is able to ambulate independently  Occupation: Retired (previously worked in sales and finance) (07 Oct 2020 13:50)      REVIEW OF SYSTEMS:  CONSTITUTIONAL: No fever, No chills, No fatigue, No myalgia, No Body ache, No Weakness  EYES: No eye pain,  No visual disturbances, No discharge, No Redness  ENMT: No ear pain, No nose bleed, No vertigo; No sinus pain, No throat pain, No Congestion  NECK: No pain, No stiffness  RESPIRATORY: No cough, No wheezing, No hemoptysis, No shortness of breath  CARDIOVASCULAR: No chest pain, No palpitations  GASTROINTESTINAL: No abdominal pain, No epigastric pain. No nausea, No vomiting, No diarrhea, No constipation; [  ] BM  GENITOURINARY: No dysuria, No frequency, No urgency, No hematuria, No incontinence  NEUROLOGICAL: No headaches, No dizziness, No numbness, No tingling, No tremors, No weakness  EXTREMITIES: No Swelling, No Pain, No Edema  SKIN: [ x ] No itching, burning, rashes, or lesions   MUSCULOSKELETAL: No joint pain, No joint swelling; No muscle pain, No back pain, No extremity pain  PSYCHIATRIC: No depression, No anxiety, No mood swings, No difficulty sleeping at night  PAIN SCALE: [ x ] None  [  ] Other-  ROS Unable to obtain due to: [  ] Dementia  [  ] Lethargy  [  ] Sedated  [  ] Non verbal  REST OF REVIEW OF SYSTEMS: [x  ] Normal     Vital Signs Last 24 Hrs  T(C): 36.6 (09 Oct 2020 05:19), Max: 36.8 (08 Oct 2020 12:10)  T(F): 97.8 (09 Oct 2020 05:19), Max: 98.3 (08 Oct 2020 12:10)  HR: 66 (09 Oct 2020 05:19) (66 - 75)  BP: 135/77 (09 Oct 2020 05:19) (125/68 - 140/65)  BP(mean): --  RR: 19 (09 Oct 2020 05:19) (16 - 19)  SpO2: 100% (09 Oct 2020 05:19) (95% - 100%)    CAPILLARY BLOOD GLUCOSE          I&O's Summary    PHYSICAL EXAM:  GENERAL:  [ x ] NAD, [x] Well appearing, [  ] Agitated, [  ] Drowsy, [  ] Lethargy, [  ] Confused   HEAD:  [ x ] Normal, [  ] Other  EYES:  [ x ] EOMI, [ x ] PERRLA, [x  ] Conjunctiva and sclera clear normal, [  ] Other, [  ] Pallor, [  ] Discharge  ENMT:  [ x ] Normal, [ x ] Moist mucous membranes, [x  ] Good dentition, [ x ] No thrush  NECK:  [ x ] Supple, [ x ] No JVD, [ x ] Normal thyroid, [  ] Lymphadenopathy, [  ] Other  CHEST/LUNG:  [ x ] Clear to auscultation bilaterally, [  ] Breath Sounds equal B/L / decreased, [  ] Poor effort, [ x ] No rales, [x  ] No rhonchi, [ x ] No wheezing  HEART:  [ x ] Regular rate and rhythm, [  ] Tachycardia, [  ] Bradycardia, [  ] Irregular, [ x ] No murmurs, No rubs, No gallops, [  ] PPM in place (Mfr:  )  ABDOMEN:  [ x ] Soft, [ x ] Nontender, [ x ] Nondistended, [x  ] No mass, [ x ] Bowel sounds present, [  ] Obese  NERVOUS SYSTEM:  [x  ] Alert & Oriented x3, [  ] Nonfocal, [  ] Confusion, [  ] Encephalopathic, [  ] Sedated, [  ] Unable to assess, [  ] Dementia, [  ] Other-  EXTREMITIES:  [x  ] 2+ Peripheral Pulses, No clubbing, No cyanosis,  [  ] Edema B/L lower EXT, [  ] PVD stasis skin changes B/L lower EXT, [  ] Wound  LYMPH:  No lymphadenopathy noted  SKIN:  [ x ] No rashes or lesions, [  ] Pressure ulcers, [  ] Ecchymosis, [  ] Skin tears, [  ] Other    DIET: Diet, Regular (10-07-20 @ 15:47)      LABS:                        11.6   7.65  )-----------( 286      ( 09 Oct 2020 07:19 )             33.8     09 Oct 2020 07:19    138    |  102    |  9      ----------------------------<  93     4.4     |  29     |  0.78     Ca    8.8        09 Oct 2020 07:19      PT/INR - ( 07 Oct 2020 11:39 )   PT: 14.0 sec;   INR: 1.21 ratio         PTT - ( 07 Oct 2020 11:39 )  PTT:28.4 sec  Urinalysis Basic - ( 07 Oct 2020 11:00 )    Color: Yellow / Appearance: Turbid / S.010 / pH: x  Gluc: x / Ketone: Small  / Bili: Negative / Urobili: 1   Blood: x / Protein: 100 / Nitrite: Positive   Leuk Esterase: Moderate / RBC: 6-10 /HPF / WBC TNTC /HPF   Sq Epi: x / Non Sq Epi: Few / Bacteria: TNTC      Culture Results:   >100,000 CFU/ml Escherichia coli (10-07 @ 15:17)  Culture Results:   No growth to date. (10-07 @ 15:13)  Culture Results:   No growth to date. (10-07 @ 15:13)    culture blood  -- .Urine Clean Catch (Midstream) 10-07 @ 15:17    culture urine  --  10-07 @ 15:17  culture blood  -- .Blood Blood-Peripheral 10 @ 15:13    culture urine  --  10-07 @ 15:13          Culture - Urine (collected 07 Oct 2020 15:17)  Source: .Urine Clean Catch (Midstream)  Preliminary Report (08 Oct 2020 22:41):    >100,000 CFU/ml Escherichia coli    Culture - Blood (collected 07 Oct 2020 15:13)  Source: .Blood Blood-Peripheral  Preliminary Report (08 Oct 2020 16:01):    No growth to date.    Culture - Blood (collected 07 Oct 2020 15:13)  Source: .Blood Blood-Peripheral  Preliminary Report (08 Oct 2020 16:01):    No growth to date.       Anemia Panel:      Thyroid Panel:                RADIOLOGY & ADDITIONAL TESTS:      HEALTH ISSUES - PROBLEM Dx:  Chronic back pain  Chronic back pain    Dry eyes  Dry eyes    Carotid stenosis  Carotid stenosis    Diarrhea  Diarrhea    Need for prophylactic measure  Need for prophylactic measure    Hematuria, unspecified type  Hematuria, unspecified type    Current tobacco use  Current tobacco use    UTI (urinary tract infection)  UTI (urinary tract infection)    Acute hyponatremia  Acute hyponatremia          Consultant(s) Notes Reviewed:  [  ] YES     Care Discussed with [ x ] Consultants, [  ] Patient, [  ] Family, [  ] HCP, [  ] , [  ] Social Service, [  ] RN, [  ] Physical Therapy, [  ] Palliative Care Team  DVT PPX: [ x ] Lovenox, [  ] SC Heparin, [  ] Coumadin, [  ] Xarelto, [  ] Eliquis, [  ] Pradaxa, [  ] IV Heparin drip, [  ] SCD, [  ] Ambulation, [  ] Contraindicated 2/2 GI Bleed, [  ] Contraindicated 2/2  Bleed, [  ] Contraindicated 2/2 Brain Bleed  Advanced Directive: [  ] None, [  ] DNR/DNI Patient is a 65y old  Female who presents with a chief complaint of Hyponatremia, recurrent UTI (09 Oct 2020 08:00)      INTERVAL HPI: 69 yo F patient with PMH current smoker, HLD, C. diff. and recurrent UTI. Presents today to the ED c/o  2x nonbloody foul smelling diarrhea since Saturday. Pt had a UTI for the last 6 weeks that has been getting worse despite multiple abx therapy. Pt had been on Nitrofurantoin (), ciprofloxacin (september 15) and Bactrim () and then recently restarted on Nitrofurantoin. Pt only took 2 doses of bactrim due to nausea so had to be started on nitrofurantoin again. Pt has been reporting worsening cramping pain in the lower abdomen on and off for about 6 weeks. She states that the pain improved when on Nitrofurantoin initially and cefitin, however the cramping kept returning. When patient was started on macrobid the second time she had 2 episodes of nonbloody foul smelling diarrhea on Saturday and  and 1 episode of bilious nonbloody vomiting on . She also admitted subjective fever X1  on Saturday. She states that she doesn't feel well overall. This prompted the patient to go to the ED because of her previous hx of C Diff (~8 years ago). Pt reports loss of appetite and has not been able to eat or drink much since Monday. Pt also reports of feeling dehydrated even though she has been enough drinking water. Pt had 1 episode of hematuria (3 weeks ago), as well as a general sensation of pressure, dysuria, and urgency on and off for the last 6 weeks.     Patient with recurrent UTI, was given Nitrofurantoin (),ciprofloxacin (september 15) and Bactrim ().      of note- pt does not have PCP    In the ED Vitals:   /65 HR 96 T 98.6 F SpO2 98% RA   Labs:   WBC: 11.46 with bands Neutrophil 9.54   H/H: 11.7/31.7  UA: grossly positive for nitrites, mod leuk esterase and large amount of blood, no white cells   CXR: no active pulmonary disease     Given 1.7 L NS bolus X 1  Started on IV Rocephin 1 g     10/8/2020: Patient and examined at bedside. reports a mild cough and runny nose. No fever. says it is likely from allergies and usually takes flonase. Offered to flonase, however she refused. denies any additional episodes of diarrhea and states her nausea has improved. Has not had a BM since admission. denies cp, palpitations, sob, abdominal pain, n/v/d/c.     - no BM C diff unlikely since patient has not had any BM   - kidney and bladder ultrasound: urinary bladder: Mild wall thickening. Volume 99 mL.No hydronephrosis. Pt seen By ID- Dr bangura     10/9/2020: Patient seen and examined at bedside. Reports no BM, no more episodes of diarrhea. No fever. No cough this morning. Denies cp, palpitations, sob, abdominal pain, dysuria, hematuria, n/v/d/c. plan for dc today.     OVERNIGHT EVENTS: No acute overnight events.     Home Medications:  Acidophilus oral tablet: 1 tab(s) orally 2 times a day (07 Oct 2020 13:00)  aspirin 81 mg oral tablet: 1 tab(s) orally once a day (07 Oct 2020 13:00)  atorvastatin 20 mg oral tablet: 1 tab(s) orally once a day (07 Oct 2020 13:00)  Cranberry oral tablet: 1 tab(s) orally once a day (07 Oct 2020 13:00)  Dry Eye Relief ophthalmic solution: 1 drop(s) to each affected eye 4 times a day, As Needed (07 Oct 2020 13:00)  Glucosamine Chondroitin oral capsule: 1 cap(s) orally once a day (07 Oct 2020 13:00)  Multiple Vitamins oral tablet: 1 tab(s) orally once a day (07 Oct 2020 13:00)  Restasis 0.05% ophthalmic emulsion: 1 drop(s) to each affected eye every 12 hours (07 Oct 2020 13:00)  Vitamin C: 1 cap(s) orally once a day (07 Oct 2020 13:00)  vitamin E oral capsule: 1 cap(s) orally once a day (07 Oct 2020 13:00)      MEDICATIONS  (STANDING):  artificial  tears Solution 1 Drop(s) Both EYES two times a day  aspirin enteric coated 81 milliGRAM(s) Oral daily  atorvastatin 20 milliGRAM(s) Oral at bedtime  cefTRIAXone   IVPB 1000 milliGRAM(s) IV Intermittent every 24 hours  enoxaparin Injectable 40 milliGRAM(s) SubCutaneous daily  influenza   Vaccine 0.5 milliLiter(s) IntraMuscular once  multivitamin 1 Tablet(s) Oral daily  nicotine   7 mG/24 Hr(s) Transdermal Patch -  Peds 1 Patch Transdermal daily  saccharomyces boulardii 250 milliGRAM(s) Oral two times a day    MEDICATIONS  (PRN):  ondansetron Injectable 4 milliGRAM(s) IV Push every 6 hours PRN Nausea      No Known Allergies      Social History:  Alcohol: socially   Tobacco: 50 pk/years   Drug: N/A  Ambulate: Pt is able to ambulate independently  Occupation: Retired (previously worked in sales and finance) (07 Oct 2020 13:50)      REVIEW OF SYSTEMS:  CONSTITUTIONAL: No fever, No chills, No fatigue, No myalgia, No Body ache, No Weakness  EYES: No eye pain,  No visual disturbances, No discharge, No Redness  ENMT: No ear pain, No nose bleed, No vertigo; No sinus pain, No throat pain, No Congestion  NECK: No pain, No stiffness  RESPIRATORY: No cough, No wheezing, No hemoptysis, No shortness of breath  CARDIOVASCULAR: No chest pain, No palpitations  GASTROINTESTINAL: No abdominal pain, No epigastric pain. No nausea, No vomiting, No diarrhea, No constipation; [  ] BM  GENITOURINARY: No dysuria, No frequency, No urgency, No hematuria, No incontinence  NEUROLOGICAL: No headaches, No dizziness, No numbness, No tingling, No tremors, No weakness  EXTREMITIES: No Swelling, No Pain, No Edema  SKIN: [ x ] No itching, burning, rashes, or lesions   MUSCULOSKELETAL: No joint pain, No joint swelling; No muscle pain, No back pain, No extremity pain  PSYCHIATRIC: No depression, No anxiety, No mood swings, No difficulty sleeping at night  PAIN SCALE: [ x ] None  [  ] Other-  ROS Unable to obtain due to: [  ] Dementia  [  ] Lethargy  [  ] Sedated  [  ] Non verbal  REST OF REVIEW OF SYSTEMS: [x  ] Normal     Vital Signs Last 24 Hrs  T(C): 36.6 (09 Oct 2020 05:19), Max: 36.8 (08 Oct 2020 12:10)  T(F): 97.8 (09 Oct 2020 05:19), Max: 98.3 (08 Oct 2020 12:10)  HR: 66 (09 Oct 2020 05:19) (66 - 75)  BP: 135/77 (09 Oct 2020 05:19) (125/68 - 140/65)  BP(mean): --  RR: 19 (09 Oct 2020 05:19) (16 - 19)  SpO2: 100% (09 Oct 2020 05:19) (95% - 100%)    CAPILLARY BLOOD GLUCOSE          I&O's Summary    PHYSICAL EXAM:  GENERAL:  [ x ] NAD, [x] Well appearing, [  ] Agitated, [  ] Drowsy, [  ] Lethargy, [  ] Confused   HEAD:  [ x ] Normal, [  ] Other  EYES:  [ x ] EOMI, [ x ] PERRLA, [x  ] Conjunctiva and sclera clear normal, [  ] Other, [  ] Pallor, [  ] Discharge  ENMT:  [ x ] Normal, [ x ] Moist mucous membranes, [x  ] Good dentition, [ x ] No thrush  NECK:  [ x ] Supple, [ x ] No JVD, [ x ] Normal thyroid, [  ] Lymphadenopathy, [  ] Other  CHEST/LUNG:  [ x ] Clear to auscultation bilaterally, [  ] Breath Sounds equal B/L / decreased, [  ] Poor effort, [ x ] No rales, [x  ] No rhonchi, [ x ] No wheezing  HEART:  [ x ] Regular rate and rhythm, [  ] Tachycardia, [  ] Bradycardia, [  ] Irregular, [ x ] No murmurs, No rubs, No gallops, [  ] PPM in place (Mfr:  )  ABDOMEN:  [ x ] Soft, [ x ] Nontender, [ x ] Nondistended, [x  ] No mass, [ x ] Bowel sounds present, [  ] Obese  NERVOUS SYSTEM:  [x  ] Alert & Oriented x3, [  ] Nonfocal, [  ] Confusion, [  ] Encephalopathic, [  ] Sedated, [  ] Unable to assess, [  ] Dementia, [  ] Other-  EXTREMITIES:  [x  ] 2+ Peripheral Pulses, No clubbing, No cyanosis,  [  ] Edema B/L lower EXT, [  ] PVD stasis skin changes B/L lower EXT, [  ] Wound  LYMPH:  No lymphadenopathy noted  SKIN:  [ x ] No rashes or lesions, [  ] Pressure ulcers, [  ] Ecchymosis, [  ] Skin tears, [  ] Other    DIET: Diet, Regular (10-07-20 @ 15:47)      LABS:                        11.6   7.65  )-----------( 286      ( 09 Oct 2020 07:19 )             33.8     09 Oct 2020 07:19    138    |  102    |  9      ----------------------------<  93     4.4     |  29     |  0.78     Ca    8.8        09 Oct 2020 07:19      PT/INR - ( 07 Oct 2020 11:39 )   PT: 14.0 sec;   INR: 1.21 ratio         PTT - ( 07 Oct 2020 11:39 )  PTT:28.4 sec  Urinalysis Basic - ( 07 Oct 2020 11:00 )    Color: Yellow / Appearance: Turbid / S.010 / pH: x  Gluc: x / Ketone: Small  / Bili: Negative / Urobili: 1   Blood: x / Protein: 100 / Nitrite: Positive   Leuk Esterase: Moderate / RBC: 6-10 /HPF / WBC TNTC /HPF   Sq Epi: x / Non Sq Epi: Few / Bacteria: TNTC      Culture Results:   >100,000 CFU/ml Escherichia coli (10-07 @ 15:17)  Culture Results:   No growth to date. (10-07 @ 15:13)  Culture Results:   No growth to date. (10-07 @ 15:13)    culture blood  -- .Urine Clean Catch (Midstream) 10-07 @ 15:17    culture urine  --  10-07 @ 15:17  culture blood  -- .Blood Blood-Peripheral 1007 @ 15:13    culture urine  --  10-07 @ 15:13          Culture - Urine (collected 07 Oct 2020 15:17)  Source: .Urine Clean Catch (Midstream)  Preliminary Report (08 Oct 2020 22:41):    >100,000 CFU/ml Escherichia coli    Culture - Blood (collected 07 Oct 2020 15:13)  Source: .Blood Blood-Peripheral  Preliminary Report (08 Oct 2020 16:01):    No growth to date.    Culture - Blood (collected 07 Oct 2020 15:13)  Source: .Blood Blood-Peripheral  Preliminary Report (08 Oct 2020 16:01):    No growth to date.       Anemia Panel:      Thyroid Panel:                RADIOLOGY & ADDITIONAL TESTS:      HEALTH ISSUES - PROBLEM Dx:  Chronic back pain  Chronic back pain    Dry eyes  Dry eyes    Carotid stenosis  Carotid stenosis    Diarrhea  Diarrhea    Need for prophylactic measure  Need for prophylactic measure    Hematuria, unspecified type  Hematuria, unspecified type    Current tobacco use  Current tobacco use    UTI (urinary tract infection)  UTI (urinary tract infection)    Acute hyponatremia  Acute hyponatremia          Consultant(s) Notes Reviewed:  [  ] YES     Care Discussed with [ x ] Consultants, [  ] Patient, [  ] Family, [  ] HCP, [  ] , [  ] Social Service, [  ] RN, [  ] Physical Therapy, [  ] Palliative Care Team  DVT PPX: [ x ] Lovenox, [  ] SC Heparin, [  ] Coumadin, [  ] Xarelto, [  ] Eliquis, [  ] Pradaxa, [  ] IV Heparin drip, [  ] SCD, [  ] Ambulation, [  ] Contraindicated 2/2 GI Bleed, [  ] Contraindicated 2/2  Bleed, [  ] Contraindicated 2/2 Brain Bleed  Advanced Directive: [  ] None, [  ] DNR/DNI Patient is a 65y old  Female who presents with a chief complaint of Hyponatremia, recurrent UTI (09 Oct 2020 08:00)      INTERVAL HPI: 69 yo F patient with PMH current smoker, HLD, C. diff. and recurrent UTI. Presents today to the ED c/o  2x nonbloody foul smelling diarrhea since Saturday. Pt had a UTI for the last 6 weeks that has been getting worse despite multiple abx therapy. Pt had been on Nitrofurantoin (), ciprofloxacin (september 15) and Bactrim () and then recently restarted on Nitrofurantoin. Pt only took 2 doses of bactrim due to nausea so had to be started on nitrofurantoin again. Pt has been reporting worsening cramping pain in the lower abdomen on and off for about 6 weeks. She states that the pain improved when on Nitrofurantoin initially and cefitin, however the cramping kept returning. When patient was started on macrobid the second time she had 2 episodes of nonbloody foul smelling diarrhea on Saturday and  and 1 episode of bilious nonbloody vomiting on . She also admitted subjective fever X1  on Saturday. She states that she doesn't feel well overall. This prompted the patient to go to the ED because of her previous hx of C Diff (~8 years ago). Pt reports loss of appetite and has not been able to eat or drink much since Monday. Pt also reports of feeling dehydrated even though she has been enough drinking water. Pt had 1 episode of hematuria (3 weeks ago), as well as a general sensation of pressure, dysuria, and urgency on and off for the last 6 weeks.     Patient with recurrent UTI, was given Nitrofurantoin (),ciprofloxacin (september 15) and Bactrim ().      of note- pt does not have PCP    In the ED Vitals:   /65 HR 96 T 98.6 F SpO2 98% RA   Labs:   WBC: 11.46 with bands Neutrophil 9.54   H/H: 11.7/31.7  UA: grossly positive for nitrites, mod leuk esterase and large amount of blood, no white cells   CXR: no active pulmonary disease     Given 1.7 L NS bolus X 1  Started on IV Rocephin 1 g     10/8/2020: Patient and examined at bedside. reports a mild cough and runny nose. No fever. says it is likely from allergies and usually takes flonase. Offered to flonase, however she refused. denies any additional episodes of diarrhea and states her nausea has improved. Has not had a BM since admission. denies cp, palpitations, sob, abdominal pain, n/v/d/c.     - no BM C diff unlikely since patient has not had any BM   - kidney and bladder ultrasound: urinary bladder: Mild wall thickening. Volume 99 mL.No hydronephrosis. Pt seen By ID- Dr [email or web address]     10/9/2020: Patient seen and examined at bedside. Reports no BM, no more episodes of diarrhea. No fever. No cough this morning. Denies cp, palpitations, sob, abdominal pain, dysuria, hematuria, n/v/d/c. plan for dc today.     OVERNIGHT EVENTS: No acute overnight events.     Home Medications:  Acidophilus oral tablet: 1 tab(s) orally 2 times a day (07 Oct 2020 13:00)  aspirin 81 mg oral tablet: 1 tab(s) orally once a day (07 Oct 2020 13:00)  atorvastatin 20 mg oral tablet: 1 tab(s) orally once a day (07 Oct 2020 13:00)  Cranberry oral tablet: 1 tab(s) orally once a day (07 Oct 2020 13:00)  Dry Eye Relief ophthalmic solution: 1 drop(s) to each affected eye 4 times a day, As Needed (07 Oct 2020 13:00)  Glucosamine Chondroitin oral capsule: 1 cap(s) orally once a day (07 Oct 2020 13:00)  Multiple Vitamins oral tablet: 1 tab(s) orally once a day (07 Oct 2020 13:00)  Restasis 0.05% ophthalmic emulsion: 1 drop(s) to each affected eye every 12 hours (07 Oct 2020 13:00)  Vitamin C: 1 cap(s) orally once a day (07 Oct 2020 13:00)  vitamin E oral capsule: 1 cap(s) orally once a day (07 Oct 2020 13:00)      MEDICATIONS  (STANDING):  artificial  tears Solution 1 Drop(s) Both EYES two times a day  aspirin enteric coated 81 milliGRAM(s) Oral daily  atorvastatin 20 milliGRAM(s) Oral at bedtime  cefTRIAXone   IVPB 1000 milliGRAM(s) IV Intermittent every 24 hours  enoxaparin Injectable 40 milliGRAM(s) SubCutaneous daily  influenza   Vaccine 0.5 milliLiter(s) IntraMuscular once  multivitamin 1 Tablet(s) Oral daily  nicotine   7 mG/24 Hr(s) Transdermal Patch -  Peds 1 Patch Transdermal daily  saccharomyces boulardii 250 milliGRAM(s) Oral two times a day    MEDICATIONS  (PRN):  ondansetron Injectable 4 milliGRAM(s) IV Push every 6 hours PRN Nausea      No Known Allergies      Social History:  Alcohol: socially   Tobacco: 50 pk/years   Drug: N/A  Ambulate: Pt is able to ambulate independently  Occupation: Retired (previously worked in sales and finance) (07 Oct 2020 13:50)      REVIEW OF SYSTEMS: I feel better today  CONSTITUTIONAL: No fever, No chills, No fatigue, No myalgia, No Body ache, No Weakness  EYES: No eye pain,  No visual disturbances, No discharge, No Redness  ENMT: No ear pain, No nose bleed, No vertigo; No sinus pain, No throat pain, No Congestion  NECK: No pain, No stiffness  RESPIRATORY: No cough, No wheezing, No hemoptysis, No shortness of breath  CARDIOVASCULAR: No chest pain, No palpitations  GASTROINTESTINAL: No abdominal pain, No epigastric pain. No nausea, No vomiting, No diarrhea, No constipation; [  ] BM-NONE  GENITOURINARY: No dysuria, No frequency, No urgency, No hematuria, No incontinence, NO Pressure/ NO Burning  NEUROLOGICAL: No headaches, No dizziness, No numbness, No tingling, No tremors, No weakness  EXTREMITIES: No Swelling, No Pain, No Edema  SKIN: [ x ] No itching, burning, rashes, or lesions   MUSCULOSKELETAL: No joint pain, No joint swelling; No muscle pain, No back pain, No extremity pain  PSYCHIATRIC: No depression, No anxiety, No mood swings, No difficulty sleeping at night  PAIN SCALE: [ x ] None  [  ] Other-  ROS Unable to obtain due to: [  ] Dementia  [  ] Lethargy  [  ] Sedated  [  ] Non verbal  REST OF REVIEW OF SYSTEMS: [x  ] Normal     Vital Signs Last 24 Hrs  T(C): 36.6 (09 Oct 2020 05:19), Max: 36.8 (08 Oct 2020 12:10)  T(F): 97.8 (09 Oct 2020 05:19), Max: 98.3 (08 Oct 2020 12:10)  HR: 66 (09 Oct 2020 05:19) (66 - 75)  BP: 135/77 (09 Oct 2020 05:19) (125/68 - 140/65)  BP(mean): --  RR: 19 (09 Oct 2020 05:19) (16 - 19)  SpO2: 100% (09 Oct 2020 05:19) (95% - 100%)    CAPILLARY BLOOD GLUCOSE          I&O's Summary    PHYSICAL EXAM:  GENERAL:  [ x ] NAD, [x] Well appearing, [  ] Agitated, [  ] Drowsy, [  ] Lethargy, [  ] Confused   HEAD:  [ x ] Normal, [  ] Other  EYES:  [ x ] EOMI, [ x ] PERRLA, [x  ] Conjunctiva and sclera clear normal, [  ] Other, [  ] Pallor, [  ] Discharge  ENMT:  [ x ] Normal, [ x ] Moist mucous membranes, [x  ] Good dentition, [ x ] No thrush  NECK:  [ x ] Supple, [ x ] No JVD, [ x ] Normal thyroid, [  ] Lymphadenopathy, [  ] Other  CHEST/LUNG:  [ x ] Clear to auscultation bilaterally, [  ] Breath Sounds equal B/L / decreased, [  ] Poor effort, [ x ] No rales, [x  ] No rhonchi, [ x ] No wheezing  HEART:  [ x ] Regular rate and rhythm, [  ] Tachycardia, [  ] Bradycardia, [  ] Irregular, [ x ] No murmurs, No rubs, No gallops, [  ] PPM in place (Mfr:  )  ABDOMEN:  [ x ] Soft, [ x ] Nontender, [ x ] Nondistended, [x  ] No mass, [ x ] Bowel sounds present, [  ] Obese  NERVOUS SYSTEM:  [x  ] Alert & Oriented x3, [  ] Nonfocal, [  ] Confusion, [  ] Encephalopathic, [  ] Sedated, [  ] Unable to assess, [  ] Dementia, [  ] Other-  EXTREMITIES:  [x  ] 2+ Peripheral Pulses, No clubbing, No cyanosis,  [  ] Edema B/L lower EXT, [  ] PVD stasis skin changes B/L lower EXT, [  ] Wound  LYMPH:  No lymphadenopathy noted  SKIN:  [ x ] No rashes or lesions, [  ] Pressure ulcers, [  ] Ecchymosis, [  ] Skin tears, [  ] Other    DIET: Diet, Regular (10-07-20 @ 15:47)      LABS:                        11.6   7.65  )-----------( 286      ( 09 Oct 2020 07:19 )             33.8     09 Oct 2020 07:19    138    |  102    |  9      ----------------------------<  93     4.4     |  29     |  0.78     Ca    8.8        09 Oct 2020 07:19      PT/INR - ( 07 Oct 2020 11:39 )   PT: 14.0 sec;   INR: 1.21 ratio         PTT - ( 07 Oct 2020 11:39 )  PTT:28.4 sec  Urinalysis Basic - ( 07 Oct 2020 11:00 )    Color: Yellow / Appearance: Turbid / S.010 / pH: x  Gluc: x / Ketone: Small  / Bili: Negative / Urobili: 1   Blood: x / Protein: 100 / Nitrite: Positive   Leuk Esterase: Moderate / RBC: 6-10 /HPF / WBC TNTC /HPF   Sq Epi: x / Non Sq Epi: Few / Bacteria: TNTC      Culture Results:   >100,000 CFU/ml Escherichia coli (10-07 @ 15:17)  Culture Results:   No growth to date. (10-07 @ 15:13)  Culture Results:   No growth to date. (10-07 @ 15:13)    culture blood  -- .Urine Clean Catch (Midstream) 10-07 @ 15:17    culture urine  --  10-07 @ 15:17  culture blood  -- .Blood Blood-Peripheral 10-07 @ 15:13    culture urine  --  10 @ 15:13          Culture - Urine (collected 07 Oct 2020 15:17)  Source: .Urine Clean Catch (Midstream)  Preliminary Report (08 Oct 2020 22:41):    >100,000 CFU/ml Escherichia coli    Culture - Blood (collected 07 Oct 2020 15:13)  Source: .Blood Blood-Peripheral  Preliminary Report (08 Oct 2020 16:01):    No growth to date.    Culture - Blood (collected 07 Oct 2020 15:13)  Source: .Blood Blood-Peripheral  Preliminary Report (08 Oct 2020 16:01):    No growth to date.     Culture - Urine (10.07.20 @ 15:17)   - Amikacin: S <=16   - Amoxicillin/Clavulanic Acid: S <=8/4   - Ampicillin: R >16 These ampicillin results predict results for amoxicillin   - Ampicillin/Sulbactam: S 8/4 Enterobacter, Citrobacter, and Serratia may develop resistance during prolonged therapy (3-4 days)   - Aztreonam: S <=4   - Cefazolin: S <=2 ([id number]) For uncomplicated UTI with K. pneumoniae, E. coli, or P. mirablis: YULI <=16 is sensitive and YULI >=32 is resistant. This also predicts results for oral agents cefaclor, cefdinir, cefpodoxime, cefprozil, cefuroxime axetil, cephalexin and locarbef for uncomplicated UTI. Note that some isolates may be susceptible to these agents while testing resistant to cefazolin.   - Cefepime: S <=2   - Cefoxitin: S <=8   - Ceftriaxone: S <=1 Enterobacter, Citrobacter, and Serratia may develop resistance during prolonged therapy   - Ciprofloxacin: S <=0.25   - Ertapenem: S <=0.5   - Gentamicin: S <=2   - Imipenem: S <=1   - Levofloxacin: S <=0.5   - Meropenem: S <=1   - Nitrofurantoin: S <=32 Should not be used to treat pyelonephritis   - Piperacillin/Tazobactam: S <=8   - Tigecycline: S <=2   - Tobramycin: S <=2   - Trimethoprim/Sulfamethoxazole: R >   Specimen Source: .Urine Clean Catch (Midstream)   Culture Results:   >100,000 CFU/ml Escherichia coli   Organism Identification: Escherichia coli       Thyroid Panel:    RADIOLOGY & ADDITIONAL TESTS:  ad< from: US Kidney and Bladder (10.08.20 @ 09:19) >    EXAM:  US KIDNEYS AND BLADDER                            PROCEDURE DATE:  10/08/2020          INTERPRETATION:  CLINICAL INFORMATION: Urinary tract infection    COMPARISON: None available.    TECHNIQUE: Sonography of the kidneys and bladder.    FINDINGS:    Right kidney: 10.3 cm. No renal mass, hydronephrosis or calculi.    Left kidney: 12.2 cm. No renal mass, hydronephrosis or calculi.    Urinary bladder: Mild wall thickening. Volume 99 mL.    IMPRESSION:    No hydronephrosis.            < end of copied text >        HEALTH ISSUES - PROBLEM Dx:  Chronic back pain  Chronic back pain    Dry eyes  Dry eyes    Carotid stenosis  Carotid stenosis    Diarrhea  Diarrhea    Need for prophylactic measure  Need for prophylactic measure    Hematuria, unspecified type  Hematuria, unspecified type    Current tobacco use  Current tobacco use    UTI (urinary tract infection)  UTI (urinary tract infection)    Acute hyponatremia  Acute hyponatremia      Consultant(s) Notes Reviewed:  [ x ] YES     Care Discussed with [ x ] Consultants, [ x ] Patient, [  ] Family, [  ] HCP, [  ] , [  ] Social Service, [ x ] RN, [  ] Physical Therapy, [  ] Palliative Care Team  DVT PPX: [ x ] Lovenox, [  ] SC Heparin, [  ] Coumadin, [  ] Xarelto, [  ] Eliquis, [  ] Pradaxa, [  ] IV Heparin drip, [  ] SCD, [  ] Ambulation, [  ] Contraindicated 2/2 GI Bleed, [  ] Contraindicated 2/2  Bleed, [  ] Contraindicated 2/2 Brain Bleed  Advanced Directive: [x  ] None, [  ] DNR/DNI

## 2020-10-09 NOTE — PROGRESS NOTE ADULT - PROBLEM SELECTOR PLAN 8
- Lovenox 40 mg subQ for DVT ppx   IMPROVE VTE Individual Risk Assessment          RISK                                                          Points  [  ] Previous VTE                                                3  [  ] Thrombophilia                                             2  [  ] Lower limb paralysis                                   2        (unable to hold up >15 seconds)    [  ] Current Cancer                                             2         (within 6 months)  [  ] Immobilization > 24 hrs                              1  [  ] ICU/CCU stay > 24 hours                             1  [ x ] Age > 60                                                         1    IMPROVE VTE Score:         [     1    ] - Lovenox 40 mg subQ for DVT ppx     IMPROVE VTE Individual Risk Assessment          RISK                                                          Points  [  ] Previous VTE                                                3  [  ] Thrombophilia                                             2  [  ] Lower limb paralysis                                   2        (unable to hold up >15 seconds)    [  ] Current Cancer                                             2         (within 6 months)  [  ] Immobilization > 24 hrs                              1  [  ] ICU/CCU stay > 24 hours                             1  [ x ] Age > 60                                                         1    IMPROVE VTE Score:         [     1    ]

## 2020-10-09 NOTE — DIETITIAN INITIAL EVALUATION ADULT. - PROBLEM SELECTOR PLAN 1
Acute  -On admission Na 126 likely 2/2 to dehydration (diarrhea, vomiting and decreased PO intake ~3-4 days)  - s/p IVF 1.7 NS bolus ED   - avoid correction of Na > 8 mEq/24 hours  - stat repeat BMP Na 131, will hold off on additional fluids  - encourage PO intake   - fu AM BMP

## 2020-10-09 NOTE — CONSULT NOTE ADULT - SUBJECTIVE AND OBJECTIVE BOX
CHIEF COMPLAINT:    HPI:  69 yo F patient with PMH current smoker, HLD, C. diff. and recurrent UTI. Admitted w/ 2x nonbloody foul smelling diarrhea  and hyponatremia. Pt had a UTI for the last 6 weeks that has been getting worse despite multiple abx therapy. Ucx 20 >100k EC res amp/tetra/sulfa. Pt had been on Nitrofurantoin (), ciprofloxacin (september 15) and Bactrim (). Pt has been reporting worsening cramping pain in the lower abdomen on and off for about 6 weeks. She states that the pain improved when on Nitrofurantoin initially and cefitin, however the cramping kept returning. When patient was started on macrobid the second time she had 2 episodes of nonbloody foul smelling diarrhea on Saturday and  and 1 episode of bilious nonbloody vomiting on . She also admitted subjective fever X1  on Saturday. She states that she doesn't feel well overall. This prompted the patient to go to the ED because of her previous hx of C Diff (~8 years ago). Pt reports loss of appetite and has not been able to eat or drink much since Monday. Pt also reports of feeling dehydrated even though she has been enough drinking water. Pt had 1 episode of hematuria (3 weeks ago), as well as a general sensation of pressure, dysuria, and urgency on and off for the last 6 weeks.     SInce admission pt feeling much improved. Diarrhea has stopped. Na returned to nl. Ucx >100k EC; sens pending.      PAST MEDICAL & SURGICAL HISTORY:  Hematuria, unspecified type    Current tobacco use    Dry eyes    Chronic back pain    No significant past surgical history        REVIEW OF SYSTEMS:    CONSTITUTIONAL: No weakness, fevers or chills  EYES/ENT: No visual changes;  No vertigo or throat pain   NECK: No pain or stiffness  RESPIRATORY: No cough, wheezing, hemoptysis; No shortness of breath  CARDIOVASCULAR: No chest pain or palpitations  GASTROINTESTINAL: No abdominal or epigastric pain. No nausea, vomiting, or hematemesis; No diarrhea or constipation. No melena or hematochezia.  GENITOURINARY: No dysuria, frequency or hematuria  NEUROLOGICAL: No numbness or weakness  SKIN: No itching, burning, rashes, or lesions   All other review of systems is negative unless indicated above.    MEDICATIONS  (STANDING):  artificial  tears Solution 1 Drop(s) Both EYES two times a day  aspirin enteric coated 81 milliGRAM(s) Oral daily  atorvastatin 20 milliGRAM(s) Oral at bedtime  cefTRIAXone   IVPB 1000 milliGRAM(s) IV Intermittent every 24 hours  enoxaparin Injectable 40 milliGRAM(s) SubCutaneous daily  influenza   Vaccine 0.5 milliLiter(s) IntraMuscular once  multivitamin 1 Tablet(s) Oral daily  nicotine   7 mG/24 Hr(s) Transdermal Patch -  Peds 1 Patch Transdermal daily  saccharomyces boulardii 250 milliGRAM(s) Oral two times a day    MEDICATIONS  (PRN):  ondansetron Injectable 4 milliGRAM(s) IV Push every 6 hours PRN Nausea      Allergies    No Known Allergies    Intolerances        Social History:  Alcohol: Denied  Smoking: smoker  Drug Use: Denied  Marital Status:     FAMILY HISTORY:  FH: lung cancer  (Grandfather)    FH: HTN (hypertension)  (Mother, Brother)    FH: CAD (coronary artery disease) (Mother)        Vital Signs Last 24 Hrs  T(C): 36.6 (09 Oct 2020 05:19), Max: 36.8 (08 Oct 2020 12:10)  T(F): 97.8 (09 Oct 2020 05:19), Max: 98.3 (08 Oct 2020 12:10)  HR: 66 (09 Oct 2020 05:19) (66 - 75)  BP: 135/77 (09 Oct 2020 05:19) (125/68 - 140/65)  BP(mean): --  RR: 19 (09 Oct 2020 05:19) (16 - 19)  SpO2: 100% (09 Oct 2020 05:19) (95% - 100%)    PHYSICAL EXAM:    Constitutional: NAD, well-developed  HEENT: WARREN, EOMI, Normal Hearing  Neck: No LAD  Back: Normal spine flexure, No CVA tenderness  Respiratory: nl resp effort  Cardiovascular: RRR  Abd: BS+, soft, NT/ND  Extremities: No peripheral edema  Neurological: A/O x 3, no focal deficits  Psychiatric: Normal mood, normal affect    LABS:                        11.6   7.65  )-----------( 286      ( 09 Oct 2020 07:19 )             33.8     10-    138  |  102  |  9   ----------------------------<  93  4.4   |  29  |  0.78    Ca    8.8      09 Oct 2020 07:19    TPro  7.4  /  Alb  3.0<L>  /  TBili  0.3  /  DBili  x   /  AST  27  /  ALT  34  /  AlkPhos  94  10-08    PT/INR - ( 07 Oct 2020 11:39 )   PT: 14.0 sec;   INR: 1.21 ratio         PTT - ( 07 Oct 2020 11:39 )  PTT:28.4 sec  Urinalysis Basic - ( 07 Oct 2020 11:00 )    Color: Yellow / Appearance: Turbid / S.010 / pH: x  Gluc: x / Ketone: Small  / Bili: Negative / Urobili: 1   Blood: x / Protein: 100 / Nitrite: Positive   Leuk Esterase: Moderate / RBC: 6-10 /HPF / WBC TNTC /HPF   Sq Epi: x / Non Sq Epi: Few / Bacteria: TNTC      Urine Culture:   Blood Cultures  10-07 @ 15:17   >100,000 CFU/ml Escherichia coli  --  --  10-07 @ 15:13   No growth to date.  --  --      RADIOLOGY & ADDITIONAL STUDIES:    JERALD - no hydro or collection.

## 2020-10-12 LAB
CULTURE RESULTS: SIGNIFICANT CHANGE UP
CULTURE RESULTS: SIGNIFICANT CHANGE UP
SPECIMEN SOURCE: SIGNIFICANT CHANGE UP
SPECIMEN SOURCE: SIGNIFICANT CHANGE UP

## 2020-12-18 ENCOUNTER — RX RENEWAL (OUTPATIENT)
Age: 65
End: 2020-12-18

## 2020-12-23 ENCOUNTER — APPOINTMENT (OUTPATIENT)
Dept: CARDIOLOGY | Facility: CLINIC | Age: 65
End: 2020-12-23
Payer: COMMERCIAL

## 2020-12-23 ENCOUNTER — NON-APPOINTMENT (OUTPATIENT)
Age: 65
End: 2020-12-23

## 2020-12-23 VITALS
HEIGHT: 64 IN | DIASTOLIC BLOOD PRESSURE: 74 MMHG | OXYGEN SATURATION: 99 % | SYSTOLIC BLOOD PRESSURE: 119 MMHG | WEIGHT: 130 LBS | HEART RATE: 70 BPM | BODY MASS INDEX: 22.2 KG/M2

## 2020-12-23 DIAGNOSIS — I25.10 ATHEROSCLEROTIC HEART DISEASE OF NATIVE CORONARY ARTERY W/OUT ANGINA PECTORIS: ICD-10-CM

## 2020-12-23 PROCEDURE — 99072 ADDL SUPL MATRL&STAF TM PHE: CPT

## 2020-12-23 PROCEDURE — 99214 OFFICE O/P EST MOD 30 MIN: CPT

## 2020-12-23 PROCEDURE — 93000 ELECTROCARDIOGRAM COMPLETE: CPT

## 2020-12-23 NOTE — DISCUSSION/SUMMARY
[With Me] : with me [FreeTextEntry1] : Pippa is a 65 year old female here for evaluation. She has nonobstructive CAD.\par \par Her blood pressure is at goal. Her physical exam is unremarkable, other than a left carotid bruit. Her EKG demonstrates a sinus rhythm without obvious ischemia or chamber enlargement. Her LV function is normal\par \par She will continue ASA and Liptior. Her most recent LDL is at goal. I have again stressed the need for diet, exercise, and smoking cessation, to reduce her overall cv risk. We will repeat her carotids, which has demonstrated a 50-79% stenosis of her left ica.\par \par I will call her with the results of the test and arrange follow up.

## 2020-12-23 NOTE — HISTORY OF PRESENT ILLNESS
[FreeTextEntry1] : Pippa is a 65 year old female here for evaluation.\par \par She presented to to the hospital on 5/5/2019 with an episode of palpitations, and chest pounding after eating Chinese food. She was found to have a mild troponin leak, was treated for NSTEMI, and eventually transferred for cardiac catheterization. Her cardiac catheterization was notable for a 30% RCA lesion, but no obstructive coronary disease.\par \par I last saw her 12/2019.\par She is here today for followup. She is feeling well overall. She is now smoking only a few cigarettes/day. She denies significant chest pain and significant palpitations. She is currently not on any medications other than asa and Lipitor. Her LDL improved to <60.\par Carotid sonogram demonstrated 50-79% stenosis of her left ICA.\par \par She has no lower extremity swelling, orthopnea, PND, dizziness, lightheadedness, and near syncope. She denies significant shortness of breath.

## 2020-12-23 NOTE — PHYSICAL EXAM
[General Appearance - Well Developed] : well developed [Normal Appearance] : normal appearance [Well Groomed] : well groomed [General Appearance - Well Nourished] : well nourished [No Deformities] : no deformities [General Appearance - In No Acute Distress] : no acute distress [Normal Conjunctiva] : the conjunctiva exhibited no abnormalities [Eyelids - No Xanthelasma] : the eyelids demonstrated no xanthelasmas [Normal Oral Mucosa] : normal oral mucosa [No Oral Pallor] : no oral pallor [No Oral Cyanosis] : no oral cyanosis [Normal Jugular Venous A Waves Present] : normal jugular venous A waves present [Normal Jugular Venous V Waves Present] : normal jugular venous V waves present [No Jugular Venous Argueta A Waves] : no jugular venous argueta A waves [Abdomen Soft] : soft [Abdomen Tenderness] : non-tender [Abdomen Mass (___ Cm)] : no abdominal mass palpated [Abnormal Walk] : normal gait [Gait - Sufficient For Exercise Testing] : the gait was sufficient for exercise testing [Nail Clubbing] : no clubbing of the fingernails [Cyanosis, Localized] : no localized cyanosis [Petechial Hemorrhages (___cm)] : no petechial hemorrhages [Skin Color & Pigmentation] : normal skin color and pigmentation [No Venous Stasis] : no venous stasis [Skin Lesions] : no skin lesions [No Skin Ulcers] : no skin ulcer [No Xanthoma] : no  xanthoma was observed [Oriented To Time, Place, And Person] : oriented to person, place, and time [Affect] : the affect was normal [Mood] : the mood was normal [No Anxiety] : not feeling anxious [] : no respiratory distress [Respiration, Rhythm And Depth] : normal respiratory rhythm and effort [Exaggerated Use Of Accessory Muscles For Inspiration] : no accessory muscle use [Auscultation Breath Sounds / Voice Sounds] : lungs were clear to auscultation bilaterally [Normal Rate] : normal [Normal S1] : normal S1 [Normal S2] : normal S2 [S3] : no S3 [S4] : no S4 [No Murmur] : no murmurs heard [Right Carotid Bruit] : no bruit heard over the right carotid [Left Carotid Bruit] : left carotid bruit heard [Right Femoral Bruit] : no bruit heard over the right femoral artery [Left Femoral Bruit] : no bruit heard over the left femoral artery [2+] : left 2+ [No Abnormalities] : the abdominal aorta was not enlarged and no bruit was heard [No Pitting Edema] : no pitting edema present

## 2020-12-28 ENCOUNTER — APPOINTMENT (OUTPATIENT)
Dept: OTOLARYNGOLOGY | Facility: CLINIC | Age: 65
End: 2020-12-28
Payer: COMMERCIAL

## 2020-12-28 VITALS
HEART RATE: 74 BPM | WEIGHT: 130 LBS | SYSTOLIC BLOOD PRESSURE: 113 MMHG | DIASTOLIC BLOOD PRESSURE: 73 MMHG | BODY MASS INDEX: 22.2 KG/M2 | HEIGHT: 64 IN | TEMPERATURE: 97.21 F

## 2020-12-28 DIAGNOSIS — J31.1 CHRONIC NASOPHARYNGITIS: ICD-10-CM

## 2020-12-28 DIAGNOSIS — H90.71 MIXED CONDUCTIVE AND SENSORINEURAL HEARING LOSS, UNILATERAL, RIGHT EAR, WITH UNRESTRICTED HEARING ON THE CONTRALATERAL SIDE: ICD-10-CM

## 2020-12-28 PROCEDURE — 92511 NASOPHARYNGOSCOPY: CPT

## 2020-12-28 PROCEDURE — 92567 TYMPANOMETRY: CPT

## 2020-12-28 PROCEDURE — 92557 COMPREHENSIVE HEARING TEST: CPT

## 2020-12-28 PROCEDURE — 99214 OFFICE O/P EST MOD 30 MIN: CPT | Mod: 25

## 2020-12-28 PROCEDURE — 99072 ADDL SUPL MATRL&STAF TM PHE: CPT

## 2020-12-28 RX ORDER — BACILLUS COAGULANS/INULIN 1B-250 MG
CAPSULE ORAL
Refills: 0 | Status: ACTIVE | COMMUNITY

## 2020-12-28 RX ORDER — METHYLPREDNISOLONE 4 MG/1
4 TABLET ORAL
Qty: 1 | Refills: 0 | Status: ACTIVE | COMMUNITY
Start: 2020-12-28 | End: 1900-01-01

## 2020-12-28 RX ORDER — FLUTICASONE PROPIONATE 50 UG/1
50 SPRAY, METERED NASAL
Qty: 1 | Refills: 5 | Status: ACTIVE | COMMUNITY
Start: 2020-12-28 | End: 1900-01-01

## 2020-12-28 RX ORDER — CRANBERRY FRUIT EXTRACT 200 MG
CAPSULE ORAL
Refills: 0 | Status: ACTIVE | COMMUNITY

## 2020-12-28 RX ORDER — ACETAMINOPHEN 500 MG
TABLET ORAL
Refills: 0 | Status: ACTIVE | COMMUNITY

## 2020-12-28 RX ORDER — ATORVASTATIN CALCIUM 10 MG/1
10 TABLET, FILM COATED ORAL
Refills: 0 | Status: DISCONTINUED | COMMUNITY

## 2020-12-28 NOTE — PHYSICAL EXAM
[Normal] : mucosa is normal [Midline] : trachea located in midline position [de-identified] : serous A/F level AD, AS clear

## 2020-12-28 NOTE — REVIEW OF SYSTEMS
[Throat Clearing] : throat clearing [Throat Pain] : throat pain [Throat Dryness] : throat dryness [Cough] : cough [As Noted in HPI] : as noted in HPI [Negative] : Head and Neck

## 2020-12-28 NOTE — HISTORY OF PRESENT ILLNESS
[de-identified] : 65 yr old female c/o nasal congestion for 2 months.  Had otalgia AD, went to [facility] 12/3/2020, no otitis. c/o hearing loss AD worse than AS for about a week. AD gets a bit better when she bends over.  -tinnitus, dizzy, otalgia\par no discolored mucous\par -fever\par +allergy\par -hx otitis, sinusitis\par -noise exp, head trauma, FH

## 2020-12-28 NOTE — ASSESSMENT
[FreeTextEntry1] : OME w normal exam of NP\par \par  AS WNL w type A, AD mild sloping to mod/mod-severe MHL w type As/C\par \par Informed consent for steroids: We reviewed the risks of oral prednisone with include, but are not limited to: mood lability, irritability, appetite stimulation, anxiety, hypertension, difficulty sleeping, vivid dreams, hyperglycemia, cataracts, increased intra-ocular pressure, GI upset, increased bone turnover causing or exacerbating osteopenia, and risk of avascular neurosis of the hip and long bones. Verbal informed consent was obtained for the use of prednisone.\par \par Flonase\par f/u 2 weeks

## 2021-01-14 ENCOUNTER — APPOINTMENT (OUTPATIENT)
Dept: CARDIOLOGY | Facility: CLINIC | Age: 66
End: 2021-01-14
Payer: COMMERCIAL

## 2021-01-14 PROCEDURE — 99072 ADDL SUPL MATRL&STAF TM PHE: CPT

## 2021-01-14 PROCEDURE — 93880 EXTRACRANIAL BILAT STUDY: CPT

## 2021-01-18 ENCOUNTER — APPOINTMENT (OUTPATIENT)
Dept: OTOLARYNGOLOGY | Facility: CLINIC | Age: 66
End: 2021-01-18
Payer: COMMERCIAL

## 2021-01-18 VITALS
DIASTOLIC BLOOD PRESSURE: 74 MMHG | BODY MASS INDEX: 21.34 KG/M2 | HEART RATE: 66 BPM | TEMPERATURE: 97.3 F | WEIGHT: 125 LBS | SYSTOLIC BLOOD PRESSURE: 111 MMHG | HEIGHT: 64 IN

## 2021-01-18 DIAGNOSIS — H69.82 OTHER SPECIFIED DISORDERS OF EUSTACHIAN TUBE, LEFT EAR: ICD-10-CM

## 2021-01-18 DIAGNOSIS — H65.21 CHRONIC SEROUS OTITIS MEDIA, RIGHT EAR: ICD-10-CM

## 2021-01-18 PROCEDURE — 99213 OFFICE O/P EST LOW 20 MIN: CPT

## 2021-01-18 PROCEDURE — 92567 TYMPANOMETRY: CPT

## 2021-01-18 PROCEDURE — 92557 COMPREHENSIVE HEARING TEST: CPT

## 2021-01-18 PROCEDURE — 99072 ADDL SUPL MATRL&STAF TM PHE: CPT

## 2021-01-18 RX ORDER — CEFUROXIME AXETIL 500 MG/1
500 TABLET ORAL
Qty: 14 | Refills: 0 | Status: DISCONTINUED | COMMUNITY
Start: 2020-09-15

## 2021-01-18 RX ORDER — SULFAMETHOXAZOLE AND TRIMETHOPRIM 800; 160 MG/1; MG/1
800-160 TABLET ORAL
Qty: 14 | Refills: 0 | Status: DISCONTINUED | COMMUNITY
Start: 2020-09-30

## 2021-01-18 RX ORDER — CYCLOBENZAPRINE HYDROCHLORIDE 10 MG/1
10 TABLET, FILM COATED ORAL
Qty: 30 | Refills: 0 | Status: DISCONTINUED | COMMUNITY
Start: 2020-07-20

## 2021-01-18 RX ORDER — NITROFURANTOIN (MONOHYDRATE/MACROCRYSTALS) 25; 75 MG/1; MG/1
100 CAPSULE ORAL
Qty: 14 | Refills: 0 | Status: DISCONTINUED | COMMUNITY
Start: 2020-12-14

## 2021-01-18 RX ORDER — CEFDINIR 300 MG/1
300 CAPSULE ORAL
Qty: 20 | Refills: 0 | Status: DISCONTINUED | COMMUNITY
Start: 2020-10-09

## 2021-01-18 NOTE — HISTORY OF PRESENT ILLNESS
[de-identified] : 65 yr old female c/o nasal congestion for 2 months.  Had otalgia AD, went to [facility] 12/3/2020, no otitis. c/o hearing loss AD worse than AS for about a week. AD gets a bit better when she bends over.  -tinnitus, dizzy, otalgia\par 12/28 dx OME AS tx w Medrol dosepak, hearing is much better\par no discolored mucous\par -fever\par +allergy\par -hx otitis, sinusitis\par -noise exp, head trauma, FH

## 2021-01-18 NOTE — PHYSICAL EXAM
[Midline] : trachea located in midline position [Normal] : tympanic membranes are normal in both ears

## 2021-01-18 NOTE — ASSESSMENT
[FreeTextEntry1] : OME resolved\par \par  WNL w type C AD, type As AS\par continue Flonase\par \par f/u prn

## 2021-05-03 ENCOUNTER — NON-APPOINTMENT (OUTPATIENT)
Age: 66
End: 2021-05-03

## 2021-05-03 ENCOUNTER — APPOINTMENT (OUTPATIENT)
Dept: CARDIOLOGY | Facility: CLINIC | Age: 66
End: 2021-05-03
Payer: MEDICARE

## 2021-05-03 VITALS
DIASTOLIC BLOOD PRESSURE: 74 MMHG | OXYGEN SATURATION: 97 % | BODY MASS INDEX: 22.2 KG/M2 | SYSTOLIC BLOOD PRESSURE: 124 MMHG | HEART RATE: 67 BPM | HEIGHT: 64 IN | WEIGHT: 130 LBS

## 2021-05-03 PROCEDURE — 99072 ADDL SUPL MATRL&STAF TM PHE: CPT

## 2021-05-03 PROCEDURE — 99214 OFFICE O/P EST MOD 30 MIN: CPT

## 2021-05-03 PROCEDURE — 93000 ELECTROCARDIOGRAM COMPLETE: CPT

## 2021-05-03 RX ORDER — CYCLOSPORINE 0.5 MG/ML
0.05 EMULSION OPHTHALMIC
Qty: 180 | Refills: 0 | Status: ACTIVE | COMMUNITY
Start: 2021-02-05

## 2021-05-03 NOTE — HISTORY OF PRESENT ILLNESS
[FreeTextEntry1] : Pippa is a 66 year old female here for follow up evaluation.\par \par She presented to to the hospital on 5/5/2019 with an episode of palpitations, and chest pounding after eating Chinese food. She was found to have a mild troponin leak, was treated for NSTEMI, and eventually transferred for cardiac catheterization. Her cardiac catheterization was notable for a 30% RCA lesion, but no obstructive coronary disease.\par \par I last saw her 12/2020.\par She is here today for followup. She is feeling well overall. She is smoking 5 cigarettes/day. She denies significant chest pain and significant palpitations. She is currently not on any medications other than asa and Lipitor. Her LDL improved to <60.\par Carotid sonogram demonstrated 50-79% stenosis of her left ICA, unchanged from 2019.\par \par She has no lower extremity swelling, orthopnea, PND, dizziness, lightheadedness, and near syncope. She denies significant shortness of breath.

## 2021-05-03 NOTE — DISCUSSION/SUMMARY
[With Me] : with me [___ Month(s)] : in [unfilled] month(s) [FreeTextEntry1] : Pippa is a 66 year old female here for evaluation. She has nonobstructive CAD.\par \par Her blood pressure is at goal. Her physical exam is unremarkable, other than a left carotid bruit. Her EKG demonstrates a sinus rhythm without obvious ischemia or chamber enlargement. Her LV function is normal\par \par She will continue ASA and Liptior. Her most recent LDL is at goal. I have again stressed the need for diet, exercise, and smoking cessation, to reduce her overall cv risk. We will repeat her carotids in 1/2022, which has demonstrated a 50-79% stenosis of her left ica.\par \par I will see her again in 6 months.

## 2021-05-03 NOTE — PHYSICAL EXAM
[Well Developed] : well developed [Well Nourished] : well nourished [No Acute Distress] : no acute distress [Normal Conjunctiva] : normal conjunctiva [Normal Venous Pressure] : normal venous pressure [Carotid Bruit] : carotid bruit [Normal Rate] : normal [Normal S1] : normal S1 [Normal S2] : normal S2 [S3] : no S3 [S4] : no S4 [No Murmur] : no murmurs heard [No Pitting Edema] : no pitting edema present [Right Carotid Bruit] : no bruit heard over the right carotid [Left Carotid Bruit] : left carotid bruit heard [Right Femoral Bruit] : no bruit heard over the right femoral artery [Left Femoral Bruit] : no bruit heard over the left femoral artery [2+] : left 2+ [No Abnormalities] : the abdominal aorta was not enlarged and no bruit was heard [Clear Lung Fields] : clear lung fields [Good Air Entry] : good air entry [No Respiratory Distress] : no respiratory distress  [Soft] : abdomen soft [Non Tender] : non-tender [No Masses/organomegaly] : no masses/organomegaly [Normal Bowel Sounds] : normal bowel sounds [Normal Gait] : normal gait [No Edema] : no edema [No Cyanosis] : no cyanosis [No Clubbing] : no clubbing [No Varicosities] : no varicosities [No Rash] : no rash [No Skin Lesions] : no skin lesions [Moves all extremities] : moves all extremities [No Focal Deficits] : no focal deficits [Normal Speech] : normal speech [Alert and Oriented] : alert and oriented [Normal memory] : normal memory [de-identified] : left

## 2021-11-03 ENCOUNTER — NON-APPOINTMENT (OUTPATIENT)
Age: 66
End: 2021-11-03

## 2021-11-03 ENCOUNTER — APPOINTMENT (OUTPATIENT)
Dept: CARDIOLOGY | Facility: CLINIC | Age: 66
End: 2021-11-03
Payer: MEDICARE

## 2021-11-03 VITALS
DIASTOLIC BLOOD PRESSURE: 72 MMHG | SYSTOLIC BLOOD PRESSURE: 145 MMHG | WEIGHT: 131 LBS | OXYGEN SATURATION: 100 % | HEART RATE: 67 BPM | HEIGHT: 64 IN | BODY MASS INDEX: 22.36 KG/M2

## 2021-11-03 DIAGNOSIS — I65.29 OCCLUSION AND STENOSIS OF UNSPECIFIED CAROTID ARTERY: ICD-10-CM

## 2021-11-03 PROCEDURE — 99214 OFFICE O/P EST MOD 30 MIN: CPT

## 2021-11-03 PROCEDURE — 93000 ELECTROCARDIOGRAM COMPLETE: CPT

## 2021-11-03 NOTE — HISTORY OF PRESENT ILLNESS
[FreeTextEntry1] : Pippa is a 66 year old female here for follow up evaluation.\par \par She presented to to the hospital on 5/5/2019 with an episode of palpitations, and chest pounding after eating Chinese food. She was found to have a mild troponin leak, was treated for NSTEMI, and eventually transferred for cardiac catheterization. Her cardiac catheterization was notable for a 30% RCA lesion, but no obstructive coronary disease.\par \par I last saw her 5/21.\par She is here today for followup. She is feeling well overall. She is smoking 5 cigarettes/day. She denies significant chest pain and significant palpitations. She is currently not on any medications other than asa and Lipitor. Her LDL improved to 60's.\par Carotid sonogram in 2020 demonstrated 50-79% stenosis of her left ICA, unchanged from 2019.\par She is under a lot of stress today. Her dog is sick and may have cancer.\par \par She has no lower extremity swelling, orthopnea, PND, dizziness, lightheadedness, and near syncope. She denies significant shortness of breath.

## 2021-11-03 NOTE — PHYSICAL EXAM
[Well Developed] : well developed [Well Nourished] : well nourished [No Acute Distress] : no acute distress [Normal Conjunctiva] : normal conjunctiva [Normal Venous Pressure] : normal venous pressure [Carotid Bruit] : carotid bruit [Normal Rate] : normal [Normal S1] : normal S1 [Normal S2] : normal S2 [No Murmur] : no murmurs heard [No Pitting Edema] : no pitting edema present [Left Carotid Bruit] : left carotid bruit heard [2+] : left 2+ [No Abnormalities] : the abdominal aorta was not enlarged and no bruit was heard [Clear Lung Fields] : clear lung fields [Good Air Entry] : good air entry [No Respiratory Distress] : no respiratory distress  [Soft] : abdomen soft [Non Tender] : non-tender [No Masses/organomegaly] : no masses/organomegaly [Normal Bowel Sounds] : normal bowel sounds [Normal Gait] : normal gait [No Edema] : no edema [No Cyanosis] : no cyanosis [No Clubbing] : no clubbing [No Varicosities] : no varicosities [No Rash] : no rash [No Skin Lesions] : no skin lesions [Moves all extremities] : moves all extremities [No Focal Deficits] : no focal deficits [Normal Speech] : normal speech [Alert and Oriented] : alert and oriented [Normal memory] : normal memory [S3] : no S3 [S4] : no S4 [Right Carotid Bruit] : no bruit heard over the right carotid [Right Femoral Bruit] : no bruit heard over the right femoral artery [Left Femoral Bruit] : no bruit heard over the left femoral artery [de-identified] : left

## 2021-11-03 NOTE — DISCUSSION/SUMMARY
[With Me] : with me [___ Month(s)] : in [unfilled] month(s) [FreeTextEntry1] : Pippa is a 66 year old female here for evaluation. She has nonobstructive CAD.\par \par Her blood pressure is elevated today, though she is under a lot of stress. Her physical exam is unremarkable, other than a left carotid bruit. Her EKG demonstrates a sinus rhythm without obvious ischemia or chamber enlargement. Her LV function is normal\par \par She will continue ASA and Liptior. Her most recent LDL is at goal. I have again stressed the need for diet, exercise, and smoking cessation, to reduce her overall cv risk. We will repeat her carotids in 1/2022, which has demonstrated a 50-79% stenosis of her left ica.\par She will monitor her BP at home.\par \par I will see her again in 6 months.

## 2022-01-18 ENCOUNTER — APPOINTMENT (OUTPATIENT)
Dept: CARDIOLOGY | Facility: CLINIC | Age: 67
End: 2022-01-18
Payer: MEDICARE

## 2022-01-18 PROCEDURE — 93880 EXTRACRANIAL BILAT STUDY: CPT

## 2022-03-16 ENCOUNTER — RX RENEWAL (OUTPATIENT)
Age: 67
End: 2022-03-16

## 2022-04-26 ENCOUNTER — APPOINTMENT (OUTPATIENT)
Dept: CARDIOLOGY | Facility: CLINIC | Age: 67
End: 2022-04-26
Payer: MEDICARE

## 2022-04-26 ENCOUNTER — NON-APPOINTMENT (OUTPATIENT)
Age: 67
End: 2022-04-26

## 2022-04-26 VITALS
BODY MASS INDEX: 22.2 KG/M2 | HEART RATE: 71 BPM | HEIGHT: 64 IN | WEIGHT: 130 LBS | DIASTOLIC BLOOD PRESSURE: 72 MMHG | OXYGEN SATURATION: 100 % | SYSTOLIC BLOOD PRESSURE: 137 MMHG

## 2022-04-26 DIAGNOSIS — G45.8 OTHER TRANSIENT CEREBRAL ISCHEMIC ATTACKS AND RELATED SYNDROMES: ICD-10-CM

## 2022-04-26 PROCEDURE — 99214 OFFICE O/P EST MOD 30 MIN: CPT

## 2022-04-26 PROCEDURE — 93000 ELECTROCARDIOGRAM COMPLETE: CPT

## 2022-04-26 NOTE — PHYSICAL EXAM
[Well Developed] : well developed [Well Nourished] : well nourished [No Acute Distress] : no acute distress [Normal Conjunctiva] : normal conjunctiva [Normal Venous Pressure] : normal venous pressure [Carotid Bruit] : carotid bruit [Normal Rate] : normal [Normal S1] : normal S1 [Normal S2] : normal S2 [No Murmur] : no murmurs heard [No Pitting Edema] : no pitting edema present [Left Carotid Bruit] : left carotid bruit heard [2+] : left 2+ [No Abnormalities] : the abdominal aorta was not enlarged and no bruit was heard [Clear Lung Fields] : clear lung fields [Good Air Entry] : good air entry [No Respiratory Distress] : no respiratory distress  [Soft] : abdomen soft [Non Tender] : non-tender [No Masses/organomegaly] : no masses/organomegaly [Normal Bowel Sounds] : normal bowel sounds [Normal Gait] : normal gait [No Edema] : no edema [No Cyanosis] : no cyanosis [No Clubbing] : no clubbing [No Varicosities] : no varicosities [No Rash] : no rash [No Skin Lesions] : no skin lesions [Moves all extremities] : moves all extremities [No Focal Deficits] : no focal deficits [Normal Speech] : normal speech [Alert and Oriented] : alert and oriented [Normal memory] : normal memory [S3] : no S3 [S4] : no S4 [Right Carotid Bruit] : no bruit heard over the right carotid [Right Femoral Bruit] : no bruit heard over the right femoral artery [Left Femoral Bruit] : no bruit heard over the left femoral artery [de-identified] : left

## 2022-04-26 NOTE — HISTORY OF PRESENT ILLNESS
[FreeTextEntry1] : Pippa is a 67 year old female here for follow up evaluation.\par \par She presented to to the hospital on 5/5/2019 with an episode of palpitations, and chest pounding after eating Chinese food. She was found to have a mild troponin leak, was treated for NSTEMI, and eventually transferred for cardiac catheterization. Her cardiac catheterization was notable for a 30% RCA lesion, but no obstructive coronary disease.\par \par I last saw her 11/21.\par \par She is here today for followup. She is feeling well overall. She is smoking 5 cigarettes/day. She denies significant chest pain and significant palpitations. She is currently not on any medications other than asa and Lipitor. Her LDL improved to 60's.\par Carotid sonogram in 2020 demonstrated 50-79% stenosis of her left ICA, unchanged from 2019.  A repeat sonogram in 1/22 demonstrated moderate atherosclerosis, with lower velocities on the left.  There was antegrade left vertebral artery waveform with presubclavian steal pattern.\par \par She has no lower extremity swelling, orthopnea, PND, dizziness. She does report dizziness and syncope in the past. She denies significant shortness of breath.

## 2022-04-26 NOTE — DISCUSSION/SUMMARY
[With Me] : with me [___ Month(s)] : in [unfilled] month(s) [FreeTextEntry1] : Pippa is a 67 year old female here for evaluation. She has nonobstructive CAD.\par \par Her blood pressure is better overall. Her physical exam is unremarkable, other than a left carotid bruit. Her EKG demonstrates a sinus rhythm without obvious ischemia or chamber enlargement. Her LV function is normal\par \par She will continue ASA and Liptior. Her most recent LDL is at goal. I have again stressed the need for diet, exercise, and smoking cessation, to reduce her overall cv risk. \par \par Carotid Dopplers have demonstrated a left ICA stenosis, as well as a pre subclavian steal waveform.  She has had episodes of dizziness, syncope, and will have an MRA neck and chest for further evaluation.\par \par We will speak after the above testing, and arrange follow-up.

## 2022-05-03 ENCOUNTER — APPOINTMENT (OUTPATIENT)
Dept: MRI IMAGING | Facility: CLINIC | Age: 67
End: 2022-05-03
Payer: MEDICARE

## 2022-05-03 ENCOUNTER — OUTPATIENT (OUTPATIENT)
Dept: OUTPATIENT SERVICES | Facility: HOSPITAL | Age: 67
LOS: 1 days | End: 2022-05-03
Payer: MEDICARE

## 2022-05-03 DIAGNOSIS — G45.8 OTHER TRANSIENT CEREBRAL ISCHEMIC ATTACKS AND RELATED SYNDROMES: ICD-10-CM

## 2022-05-03 PROCEDURE — C8910: CPT

## 2022-05-03 PROCEDURE — G1004: CPT

## 2022-05-03 PROCEDURE — 71555 MRI ANGIO CHEST W OR W/O DYE: CPT | Mod: 26,52

## 2022-05-03 PROCEDURE — 70547 MR ANGIOGRAPHY NECK W/O DYE: CPT | Mod: 26,ME

## 2022-05-03 PROCEDURE — 71555 MRI ANGIO CHEST W OR W/O DYE: CPT | Mod: 26,52,MH

## 2022-05-03 PROCEDURE — 70547 MR ANGIOGRAPHY NECK W/O DYE: CPT | Mod: ME

## 2022-09-09 ENCOUNTER — RX RENEWAL (OUTPATIENT)
Age: 67
End: 2022-09-09

## 2022-10-12 ENCOUNTER — APPOINTMENT (OUTPATIENT)
Dept: CARDIOLOGY | Facility: CLINIC | Age: 67
End: 2022-10-12

## 2022-10-12 ENCOUNTER — NON-APPOINTMENT (OUTPATIENT)
Age: 67
End: 2022-10-12

## 2022-10-12 VITALS
DIASTOLIC BLOOD PRESSURE: 80 MMHG | BODY MASS INDEX: 21.34 KG/M2 | SYSTOLIC BLOOD PRESSURE: 150 MMHG | WEIGHT: 125 LBS | OXYGEN SATURATION: 99 % | HEART RATE: 71 BPM | HEIGHT: 64 IN

## 2022-10-12 VITALS — DIASTOLIC BLOOD PRESSURE: 78 MMHG | SYSTOLIC BLOOD PRESSURE: 136 MMHG

## 2022-10-12 DIAGNOSIS — R07.89 OTHER CHEST PAIN: ICD-10-CM

## 2022-10-12 PROCEDURE — 93000 ELECTROCARDIOGRAM COMPLETE: CPT

## 2022-10-12 PROCEDURE — 99214 OFFICE O/P EST MOD 30 MIN: CPT

## 2022-10-12 NOTE — HISTORY OF PRESENT ILLNESS
[FreeTextEntry1] : Pippa is a 67 year old female here for follow up evaluation.\par \par She presented to to the hospital on 5/5/2019 with an episode of palpitations, and chest pounding after eating Chinese food. She was found to have a mild troponin leak, was treated for NSTEMI, and eventually transferred for cardiac catheterization. Her cardiac catheterization was notable for a 30% RCA lesion, but no obstructive coronary disease.\par \par I last saw her 4/22.\par \par She is here today for followup. She is feeling well overall. She is smoking afew cigarettes/day. She denies significant chest pain and significant palpitations. She is currently not on any medications other than asa and Lipitor. Her LDL improved to 70's\par Carotid sonogram in 2020 demonstrated 50-79% stenosis of her left ICA, unchanged from 2019.  A repeat sonogram in 1/22 demonstrated moderate atherosclerosis, with lower velocities on the left.  There was antegrade left vertebral artery waveform with presubclavian steal pattern.\par I subsequently sent her for an MRA, which was not interpretable because of motion artifact a CTA was recommended.\par \par She has no lower extremity swelling, orthopnea, PND, dizziness.  She feels well overall.

## 2022-10-12 NOTE — PHYSICAL EXAM
[Well Developed] : well developed [Well Nourished] : well nourished [No Acute Distress] : no acute distress [Normal Conjunctiva] : normal conjunctiva [Normal Venous Pressure] : normal venous pressure [Carotid Bruit] : carotid bruit [Normal Rate] : normal [Normal S1] : normal S1 [Normal S2] : normal S2 [No Murmur] : no murmurs heard [No Pitting Edema] : no pitting edema present [Left Carotid Bruit] : left carotid bruit heard [2+] : left 2+ [No Abnormalities] : the abdominal aorta was not enlarged and no bruit was heard [Clear Lung Fields] : clear lung fields [Good Air Entry] : good air entry [No Respiratory Distress] : no respiratory distress  [Soft] : abdomen soft [Non Tender] : non-tender [No Masses/organomegaly] : no masses/organomegaly [Normal Bowel Sounds] : normal bowel sounds [Normal Gait] : normal gait [No Edema] : no edema [No Cyanosis] : no cyanosis [No Clubbing] : no clubbing [No Varicosities] : no varicosities [No Rash] : no rash [No Skin Lesions] : no skin lesions [Moves all extremities] : moves all extremities [No Focal Deficits] : no focal deficits [Normal Speech] : normal speech [Alert and Oriented] : alert and oriented [Normal memory] : normal memory [S3] : no S3 [S4] : no S4 [Right Carotid Bruit] : no bruit heard over the right carotid [Right Femoral Bruit] : no bruit heard over the right femoral artery [Left Femoral Bruit] : no bruit heard over the left femoral artery [de-identified] : left

## 2022-10-12 NOTE — DISCUSSION/SUMMARY
[With Me] : with me [___ Month(s)] : in [unfilled] month(s) [FreeTextEntry1] : Pippa is a 67 year old female here for evaluation. She has nonobstructive CAD.\par \par Her blood pressure is elevated, though improved on repeat evaluation.. Her physical exam is unremarkable, other than a left carotid bruit. Her EKG demonstrates a sinus rhythm without obvious ischemia or chamber enlargement. Her LV function is normal\par \par She will continue ASA and Liptior. Her most recent LDL is near goal. I have again stressed the need for diet, exercise, and smoking cessation, to reduce her overall cv risk. \par \par Carotid Dopplers have demonstrated a left ICA stenosis, as well as a pre subclavian steal waveform.  Her MRA was not interpretable, and she is going to have a CTA of her neck for further evaluation.\par She is purchasing a blood pressure cuff at home, and will monitor her numbers.  We will schedule her for stress test at next visit.\par \par We will speak after the above testing, and arrange follow-up. [EKG obtained to assist in diagnosis and management of assessed problem(s)] : EKG obtained to assist in diagnosis and management of assessed problem(s)

## 2022-10-17 ENCOUNTER — OUTPATIENT (OUTPATIENT)
Dept: OUTPATIENT SERVICES | Facility: HOSPITAL | Age: 67
LOS: 1 days | End: 2022-10-17
Payer: MEDICARE

## 2022-10-17 ENCOUNTER — APPOINTMENT (OUTPATIENT)
Dept: CT IMAGING | Facility: CLINIC | Age: 67
End: 2022-10-17

## 2022-10-17 DIAGNOSIS — G45.8 OTHER TRANSIENT CEREBRAL ISCHEMIC ATTACKS AND RELATED SYNDROMES: ICD-10-CM

## 2022-10-17 DIAGNOSIS — I65.29 OCCLUSION AND STENOSIS OF UNSPECIFIED CAROTID ARTERY: ICD-10-CM

## 2022-10-17 PROCEDURE — 70498 CT ANGIOGRAPHY NECK: CPT | Mod: 26,MH

## 2022-10-17 PROCEDURE — 82565 ASSAY OF CREATININE: CPT

## 2022-10-17 PROCEDURE — 70498 CT ANGIOGRAPHY NECK: CPT

## 2022-12-03 ENCOUNTER — NON-APPOINTMENT (OUTPATIENT)
Age: 67
End: 2022-12-03

## 2023-02-15 ENCOUNTER — NON-APPOINTMENT (OUTPATIENT)
Age: 68
End: 2023-02-15

## 2023-02-15 ENCOUNTER — APPOINTMENT (OUTPATIENT)
Dept: CARDIOLOGY | Facility: CLINIC | Age: 68
End: 2023-02-15
Payer: MEDICARE

## 2023-02-15 VITALS — WEIGHT: 131 LBS | HEART RATE: 63 BPM | BODY MASS INDEX: 22.36 KG/M2 | OXYGEN SATURATION: 100 % | HEIGHT: 64 IN

## 2023-02-15 VITALS — SYSTOLIC BLOOD PRESSURE: 130 MMHG | DIASTOLIC BLOOD PRESSURE: 78 MMHG

## 2023-02-15 DIAGNOSIS — I10 ESSENTIAL (PRIMARY) HYPERTENSION: ICD-10-CM

## 2023-02-15 PROCEDURE — 99214 OFFICE O/P EST MOD 30 MIN: CPT

## 2023-02-15 PROCEDURE — 93000 ELECTROCARDIOGRAM COMPLETE: CPT

## 2023-02-15 NOTE — PHYSICAL EXAM
[Well Developed] : well developed [Well Nourished] : well nourished [No Acute Distress] : no acute distress [Normal Conjunctiva] : normal conjunctiva [Normal Venous Pressure] : normal venous pressure [Carotid Bruit] : carotid bruit [Normal Rate] : normal [Normal S1] : normal S1 [Normal S2] : normal S2 [No Murmur] : no murmurs heard [No Pitting Edema] : no pitting edema present [Left Carotid Bruit] : left carotid bruit heard [2+] : left 2+ [No Abnormalities] : the abdominal aorta was not enlarged and no bruit was heard [Clear Lung Fields] : clear lung fields [Good Air Entry] : good air entry [No Respiratory Distress] : no respiratory distress  [Soft] : abdomen soft [Non Tender] : non-tender [No Masses/organomegaly] : no masses/organomegaly [Normal Bowel Sounds] : normal bowel sounds [Normal Gait] : normal gait [No Edema] : no edema [No Cyanosis] : no cyanosis [No Clubbing] : no clubbing [No Varicosities] : no varicosities [No Rash] : no rash [No Skin Lesions] : no skin lesions [Moves all extremities] : moves all extremities [No Focal Deficits] : no focal deficits [Normal Speech] : normal speech [Alert and Oriented] : alert and oriented [Normal memory] : normal memory [S3] : no S3 [S4] : no S4 [Right Carotid Bruit] : no bruit heard over the right carotid [Right Femoral Bruit] : no bruit heard over the right femoral artery [Left Femoral Bruit] : no bruit heard over the left femoral artery [de-identified] : left

## 2023-02-15 NOTE — HISTORY OF PRESENT ILLNESS
[FreeTextEntry1] : Pippa is a 68 year old female here for follow up evaluation.\par \par She presented to to the hospital on 5/5/2019 with an episode of palpitations, and chest pounding after eating Chinese food. She was found to have a mild troponin leak, was treated for NSTEMI, and eventually transferred for cardiac catheterization. Her cardiac catheterization was notable for a 30% RCA lesion, but no obstructive coronary disease.\par \par I last saw her 10/22.\par \par She is here today for followup. She is feeling well overall. She is smoking a few cigarettes/day. She denies significant chest pain and significant palpitations. She is currently not on any medications other than asa and Lipitor. Her LDL improved to 70's\par Carotid sonogram in 2020 demonstrated 50-79% stenosis of her left ICA, unchanged from 2019.  A repeat sonogram in 1/22 demonstrated moderate atherosclerosis, with lower velocities on the left.  There was antegrade left vertebral artery waveform with presubclavian steal pattern.\par I subsequently sent her for an MRA, which was not interpretable because of motion artifact a CTA was recommended.\par She has since had a CT angio of her neck which demonstrated moderate stenosis of her proximal left subclavian artery.\par \par She has no lower extremity swelling, orthopnea, PND, dizziness.  She feels well overall.  She also denies syncope.

## 2023-02-15 NOTE — DISCUSSION/SUMMARY
[With Me] : with me [___ Month(s)] : in [unfilled] month(s) [FreeTextEntry1] : Pippa is a 68 year old female here for evaluation. She has nonobstructive CAD based on a cath in 2019.\par \par Her blood pressure is at goal.  Her physical exam is unremarkable, other than a left subclavian bruit. Her EKG demonstrates a sinus rhythm without obvious ischemia or chamber enlargement. Her LV function is normal\par \par She will continue ASA and Liptior. Her most recent LDL is near goal. I have again stressed the need for diet, exercise, and smoking cessation, to reduce her overall cv risk. \par \par A CTA of her neck did confirm moderate left subclavian stenosis.  Her blood pressures are equal in both arms and she has no worrisome symptoms at this time.\par \par Prior to next visit, she is going to have a pharmacologic nuclear stress test to evaluate her residual CAD.  She will not be able to exercise on a treadmill, because of orthopedic/neurologic issues.\par \par If no issues, I will see her again in 6 months. [EKG obtained to assist in diagnosis and management of assessed problem(s)] : EKG obtained to assist in diagnosis and management of assessed problem(s)

## 2023-04-02 ENCOUNTER — RX RENEWAL (OUTPATIENT)
Age: 68
End: 2023-04-02

## 2023-04-10 ENCOUNTER — NON-APPOINTMENT (OUTPATIENT)
Age: 68
End: 2023-04-10

## 2023-04-11 ENCOUNTER — APPOINTMENT (OUTPATIENT)
Dept: CARDIOLOGY | Facility: CLINIC | Age: 68
End: 2023-04-11
Payer: MEDICARE

## 2023-04-11 PROCEDURE — 93015 CV STRESS TEST SUPVJ I&R: CPT

## 2023-04-11 PROCEDURE — 78452 HT MUSCLE IMAGE SPECT MULT: CPT

## 2023-04-11 PROCEDURE — A9500: CPT

## 2023-08-21 ENCOUNTER — NON-APPOINTMENT (OUTPATIENT)
Age: 68
End: 2023-08-21

## 2023-08-21 ENCOUNTER — APPOINTMENT (OUTPATIENT)
Dept: CARDIOLOGY | Facility: CLINIC | Age: 68
End: 2023-08-21
Payer: MEDICARE

## 2023-08-21 VITALS — DIASTOLIC BLOOD PRESSURE: 82 MMHG | SYSTOLIC BLOOD PRESSURE: 135 MMHG | HEART RATE: 64 BPM | OXYGEN SATURATION: 100 %

## 2023-08-21 DIAGNOSIS — I77.1 STRICTURE OF ARTERY: ICD-10-CM

## 2023-08-21 DIAGNOSIS — E78.5 HYPERLIPIDEMIA, UNSPECIFIED: ICD-10-CM

## 2023-08-21 PROCEDURE — 93000 ELECTROCARDIOGRAM COMPLETE: CPT

## 2023-08-21 PROCEDURE — 99214 OFFICE O/P EST MOD 30 MIN: CPT

## 2023-08-21 NOTE — HISTORY OF PRESENT ILLNESS
[FreeTextEntry1] : Pippa is a 68 year old female here for follow up evaluation.  She presented to to the hospital on 5/5/2019 with an episode of palpitations, and chest pounding after eating Chinese food. She was found to have a mild troponin leak, was treated for NSTEMI, and eventually transferred for cardiac catheterization. Her cardiac catheterization was notable for a 30% RCA lesion, but no obstructive coronary disease.  I last saw her 2/23.  She is here today for followup. She is feeling well overall. She is smoking a few cigarettes/day. She denies significant chest pain and significant palpitations. She is currently not on any medications other than asa and Lipitor. Her LDL improved to 70's Pharm stress in 2023 without ischemia. Carotid sonogram in 2020 demonstrated 50-79% stenosis of her left ICA, unchanged from 2019.  A repeat sonogram in 1/22 demonstrated moderate atherosclerosis, with lower velocities on the left.  There was antegrade left vertebral artery waveform with presubclavian steal pattern. I subsequently sent her for an MRA, which was not interpretable because of motion artifact a CTA was recommended. She has since had a CT angio of her neck which demonstrated moderate stenosis of her proximal left subclavian artery.  She has no lower extremity swelling, orthopnea, PND, dizziness.  She feels well overall.  She also denies syncope.

## 2023-08-21 NOTE — DISCUSSION/SUMMARY
[With Me] : with me [___ Month(s)] : in [unfilled] month(s) [FreeTextEntry1] : Pippa is a 68 year old female here for follow up evaluation. She has nonobstructive CAD based on a cath in 2019.  Her blood pressure is at goal.  Her physical exam is unremarkable, other than a left subclavian bruit. Her EKG demonstrates a sinus rhythm without obvious ischemia or chamber enlargement. Her LV function is normal  She will continue ASA and Liptior. Her most recent LDL is near goal. I have again stressed the need for diet, exercise, and smoking cessation, to reduce her overall cv risk.  We will repeat a full set of blood work.  A CTA of her neck did confirm moderate left subclavian stenosis.  Though she has no worrisome symptoms at this time, there was a 12 mmHg difference in the blood pressure of each arm ( on right, and 118 on left).  I am going to repeat a carotid Doppler later this year, to evaluate for steal.  We will speak after the carotid Doppler and blood work and arrange follow-up.  If no issues, I will see her again in 6 months. [EKG obtained to assist in diagnosis and management of assessed problem(s)] : EKG obtained to assist in diagnosis and management of assessed problem(s)

## 2023-08-21 NOTE — PHYSICAL EXAM
[Well Developed] : well developed [Well Nourished] : well nourished [No Acute Distress] : no acute distress [Normal Conjunctiva] : normal conjunctiva [Normal Venous Pressure] : normal venous pressure [Carotid Bruit] : carotid bruit [Normal Rate] : normal [Normal S1] : normal S1 [Normal S2] : normal S2 [No Murmur] : no murmurs heard [No Pitting Edema] : no pitting edema present [Left Carotid Bruit] : left carotid bruit heard [2+] : left 2+ [No Abnormalities] : the abdominal aorta was not enlarged and no bruit was heard [Clear Lung Fields] : clear lung fields [Good Air Entry] : good air entry [No Respiratory Distress] : no respiratory distress  [Soft] : abdomen soft [Non Tender] : non-tender [No Masses/organomegaly] : no masses/organomegaly [Normal Bowel Sounds] : normal bowel sounds [Normal Gait] : normal gait [No Edema] : no edema [No Cyanosis] : no cyanosis [No Clubbing] : no clubbing [No Varicosities] : no varicosities [No Rash] : no rash [No Skin Lesions] : no skin lesions [Moves all extremities] : moves all extremities [No Focal Deficits] : no focal deficits [Normal Speech] : normal speech [Alert and Oriented] : alert and oriented [Normal memory] : normal memory [S3] : no S3 [S4] : no S4 [Right Carotid Bruit] : no bruit heard over the right carotid [Right Femoral Bruit] : no bruit heard over the right femoral artery [Left Femoral Bruit] : no bruit heard over the left femoral artery [de-identified] : left

## 2023-10-03 ENCOUNTER — RX RENEWAL (OUTPATIENT)
Age: 68
End: 2023-10-03

## 2023-10-03 ENCOUNTER — NON-APPOINTMENT (OUTPATIENT)
Age: 68
End: 2023-10-03

## 2023-10-05 ENCOUNTER — NON-APPOINTMENT (OUTPATIENT)
Age: 68
End: 2023-10-05

## 2023-10-24 NOTE — PATIENT PROFILE ADULT - BRADEN SCORE
Sonya Jj is a 25 year old female presenting to the walk-in clinic today with signficant other for left knee pain and swelling starting overnight. Denies injury/trauma. Denies erythema, warmth to affected area or fever.     Treatment tried prior to visit: ice    Swabs/Specimens collected during triage process:  None    CHIEF COMPLAINT:    Chief Complaint   Patient presents with   • Knee Pain     left       SUBJECTIVE:  Sonya Jj is a pleasant  25 year old female, who requests evaluation for      PAIN  to knee on the left side.   The pain  occurred 1 days ago.   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, stated her knee felt somewhat sore after that woke up in the mil the night screaming and crying stating she could not stand she could not move in bed later admits she was curled up on her side when asking her bone activities as a  she said nothing was different later reflected stated she bent down to her right side to  a cone patient was skeptical that that was a plan to end turn injury until we later discussed the mechanics showed her anatomy showed her x-ray no bruise feels swollen no numbness tingling states her mom has arthritis and gets injections and she is worried she needs injections and has arthritis *.   She treated it initially with ice.   The patient complained of severe and increasing pain with movement and has had decreased ROM  (range of motion).        no  fall ; no  break in skin ; no bleeding ;no previous injury/ pain  no numbness/tingling ;  no LOC    no hemoptysis no hematochezia no hematemesis no hematuria no bright red blood per rectum   No  chest pain ;palpitations; shortness of breath; no dizziness/ no lightheadedness; normal sensorium /not confused; no numbness/ tingling; no facial asymmetry; no weakness ; no n/v/d ; no urinary  urgency/ discomfort; no weight loss ,no travel ,    REVIEW OF SYSTEMS:  A review of systems was performed and findings relevant to this complaint are included in the HPI.    HISTORIES:  ALLERGIES:  No Known Allergies    MEDICATIONS:  No current outpatient medications on file prior to visit.     No current facility-administered medications on file prior to visit.       I have reviewed the past medical history, family history, social history, medications and allergies listed in the medical record as obtained by my nursing staff and support staff and agree with their documentation    OBJECTIVE:  PHYSICAL EXAM:  Visit Vitals  /80   Pulse 82   Temp 98.7 °F (37.1 °C) (Oral)   Resp (!) 20   Ht 4' 9\" (1.448 m)   Wt 73.9 kg (163 lb)   LMP 10/03/2023   SpO2 98%   BMI 35.27 kg/m²     General:  Well hydrated female who appears in mild  acute distress.  Increased BMI, decreased abdominal tone short statured  Unable to examine the knee at all as I brought my hands to words her left leg in general she made a small scream and leaned away from me I offered to her that I had not touch her yet she said I am so afraid of the pain  Patient continues to rub the front and the lateral aspect of her left knee there was obvious swelling, patient was able to walk to the door but leaned away from her left leg would not bear full weight would not straighten the left leg  On the exam table patient was unclear how to lift her thigh and not bend her knee I did attempt to help her with that she has normal quad and hamstring strength,   Patient able to extend shin, bilateral, patient was able to flex and extend football would not do that when I touched it toe raise heel raise the standing position were within normal limits as well as in seated position with distraction patient was able to hal the foot slightly and invert hal the foot slightly patient could not invert or hal or externally rotate or internally rotate against resistance on  left normal on right  Pulses full symmetrical bilateral light touch pinprick proprioception appeared normal yet patient continued with 12 as it would move my hands towards her leg patient had vague fullness with very light touch posterior popliteal space patient pointed to lateral joint line as increased area of pain patient has tibial torsion, she is lateral deviation of her patella on left    Physical Exam  Musculoskeletal:      Left knee: Swelling and effusion present. No deformity, erythema, ecchymosis, lacerations, bony tenderness or crepitus. Decreased range of motion. Tenderness present over the lateral joint line and LCL. No medial joint line, MCL, ACL, PCL or patellar tendon tenderness. Abnormal alignment and abnormal meniscus. Normal patellar mobility.        Legs:       Comments: Patient points to lateral knee complex as area of increased pain as well as under patella and across patella, no bruise no break in skin no dislocation patient able to bend and flex but with pain in not against resistance patient able to walk will not bear weight on left with hands-on my hand she is able to raise on toes symmetrically and rolled back onto heal symmetrical, completely unable to do any laxity maneuvers as patient was leaning away from me and using a high pitched voice of distress   Skin:     Findings: Erythema: '       Skin : no rash, lesion, or  pallor    Extremity: no cyanosis normal strength normal ROM        XR KNEE 3 VIEW LEFT    Result Date: 10/24/2023  EXAM: KNEE 3 VIEW LEFT DATE: 10/24/2023 1:43 PM COMPARISON: None CLINICAL HISTORY: Knee pain. FINDINGS: No fracture or subluxation is present. The joint spaces are preserved. A bipartite patella is incidentally noted. A moderate suprapatellar joint effusion is identified.     IMPRESSION: Moderate joint effusion. Electronically Signed by: Akin Chin MD Signed on: 10/24/2023 1:44 PM Created on Workstation ID: [id number] Signed on Workstation ID:  [id number]      2:20pm  XR image and final reading per Radiology reviewed w/ pt directly ,   physiologic diagram/ illustration explaining anatomy &pathology d/w pt   As I was typing a discharge papers patient was actively calling the [place] [facility] location for an appointment, patient was to have an Ace wrap and crutches, I re-entered the room about 10 15 minutes later the Ace wrap and crutches were present, the patient said \" she just left \" as I spoke to the nurse the patient had been on the phone actively AND  was unable to be fitted for crutches and or Ace wrap,      Has appt w. Dr Hermelindo Garcia @ [place] 10./25/2023     ASSESSMENT:  1. Internal derangement of knee joint, left    2. Acute pain of left knee    3. Lateral knee pain, left    4. Effusion of left knee joint    5. Bipartite patella          PLAN:  Orders Placed This Encounter   • CRUTCHES, UNDERARM, NOT WOOD   • ACE BANDAGE 5 INCH + PER YD   • XR KNEE 3 VIEW LEFT   • SERVICE TO ORTHOPEDICS   • SERVICE TO ORTHOPEDICS   • diclofenac (VOLTAREN) 1 % gel       No follow-ups on file.      General Orthopedic referral   > [facility]:  [phone]  >[facility]  [phone]  >[address] [place] [address]  in [place]:  [phone]  >[facility] [address] [facility] : [phone]  >[facility] : [phone]        Over the counter medications:  -Alternate tylenol 500mg every 8 hours   -Muscle creams:    Bio-freeze  [place] gel  arnica  epsom salt paste: 1/4 c Epsom + 2 tblsp h20 > mix to gritty paste, apply directly  and cover w/ very warm , moist  wash cloth   -Rest the area   -Ice or heat for maximum of 20 minutes 3xday      Ordered from Pharmacy   See orders     PATIENT INSTRUCTIONS:      Refer to AVS   The patient was advised to follow up with primary physician or to recheck with the urgent care clinic sooner if symptoms get worse or if new symptoms appear.  Patient expressed understanding and  appreciation for care and explanation   Portions of this note are brought forward from RN  note; reviewed and edited by me as appropriate.    22

## 2024-01-08 ENCOUNTER — APPOINTMENT (OUTPATIENT)
Dept: CARDIOLOGY | Facility: CLINIC | Age: 69
End: 2024-01-08
Payer: MEDICARE

## 2024-01-08 PROCEDURE — 93880 EXTRACRANIAL BILAT STUDY: CPT

## 2024-02-20 ENCOUNTER — APPOINTMENT (OUTPATIENT)
Dept: CARDIOLOGY | Facility: CLINIC | Age: 69
End: 2024-02-20
Payer: MEDICARE

## 2024-02-20 ENCOUNTER — NON-APPOINTMENT (OUTPATIENT)
Age: 69
End: 2024-02-20

## 2024-02-20 VITALS
DIASTOLIC BLOOD PRESSURE: 76 MMHG | HEART RATE: 54 BPM | WEIGHT: 132 LBS | SYSTOLIC BLOOD PRESSURE: 137 MMHG | OXYGEN SATURATION: 99 % | BODY MASS INDEX: 22.53 KG/M2 | HEIGHT: 64 IN

## 2024-02-20 DIAGNOSIS — I25.10 ATHEROSCLEROTIC HEART DISEASE OF NATIVE CORONARY ARTERY W/OUT ANGINA PECTORIS: ICD-10-CM

## 2024-02-20 DIAGNOSIS — I65.29 OCCLUSION AND STENOSIS OF UNSPECIFIED CAROTID ARTERY: ICD-10-CM

## 2024-02-20 PROCEDURE — 99214 OFFICE O/P EST MOD 30 MIN: CPT

## 2024-02-20 PROCEDURE — 93000 ELECTROCARDIOGRAM COMPLETE: CPT

## 2024-02-20 RX ORDER — ATORVASTATIN CALCIUM 40 MG/1
40 TABLET, FILM COATED ORAL
Qty: 90 | Refills: 3 | Status: ACTIVE | COMMUNITY
Start: 2019-12-17 | End: 1900-01-01

## 2024-02-20 NOTE — PHYSICAL EXAM
[Well Developed] : well developed [Well Nourished] : well nourished [No Acute Distress] : no acute distress [Normal Conjunctiva] : normal conjunctiva [Normal Venous Pressure] : normal venous pressure [Carotid Bruit] : carotid bruit [Normal Rate] : normal [Normal S1] : normal S1 [Normal S2] : normal S2 [No Murmur] : no murmurs heard [No Pitting Edema] : no pitting edema present [Left Carotid Bruit] : left carotid bruit heard [2+] : left 2+ [No Abnormalities] : the abdominal aorta was not enlarged and no bruit was heard [Clear Lung Fields] : clear lung fields [Good Air Entry] : good air entry [No Respiratory Distress] : no respiratory distress  [Soft] : abdomen soft [Non Tender] : non-tender [No Masses/organomegaly] : no masses/organomegaly [Normal Bowel Sounds] : normal bowel sounds [Normal Gait] : normal gait [No Edema] : no edema [No Cyanosis] : no cyanosis [No Clubbing] : no clubbing [No Varicosities] : no varicosities [No Rash] : no rash [No Skin Lesions] : no skin lesions [Moves all extremities] : moves all extremities [No Focal Deficits] : no focal deficits [Normal Speech] : normal speech [Alert and Oriented] : alert and oriented [Normal memory] : normal memory [S3] : no S3 [S4] : no S4 [Right Carotid Bruit] : no bruit heard over the right carotid [Right Femoral Bruit] : no bruit heard over the right femoral artery [Left Femoral Bruit] : no bruit heard over the left femoral artery [de-identified] : left

## 2024-02-20 NOTE — DISCUSSION/SUMMARY
[With Me] : with me [___ Month(s)] : in [unfilled] month(s) [FreeTextEntry1] : Pippa is a 69 year old female here for follow up evaluation. She has nonobstructive CAD based on a cath in 2019.  Her blood pressure is near goal.  Her physical exam is unremarkable, other than a left carotid/subclavian bruit. Her EKG demonstrates a sinus rhythm without obvious ischemia or chamber enlargement. Her LV function is normal.  A CTA of her neck did confirm moderate left subclavian stenosis.  There is some progression of her left sided ICA disease, and I am increasing her lipitor to 40. I have again stressed the need for diet, exercise, and smoking cessation, to reduce her overall cv risk. She will remain on ASA. We will repeat BW in 3 months.   If no issues, I will see her again in 6 months. [EKG obtained to assist in diagnosis and management of assessed problem(s)] : EKG obtained to assist in diagnosis and management of assessed problem(s)

## 2024-04-01 ENCOUNTER — RX RENEWAL (OUTPATIENT)
Age: 69
End: 2024-04-01

## 2024-04-01 RX ORDER — ATORVASTATIN CALCIUM 20 MG/1
20 TABLET, FILM COATED ORAL
Qty: 90 | Refills: 3 | Status: ACTIVE | COMMUNITY
Start: 2024-04-01

## 2024-08-21 ENCOUNTER — APPOINTMENT (OUTPATIENT)
Dept: CARDIOLOGY | Facility: CLINIC | Age: 69
End: 2024-08-21
Payer: MEDICARE

## 2024-08-21 ENCOUNTER — NON-APPOINTMENT (OUTPATIENT)
Age: 69
End: 2024-08-21

## 2024-08-21 VITALS
HEART RATE: 63 BPM | SYSTOLIC BLOOD PRESSURE: 145 MMHG | WEIGHT: 130 LBS | HEIGHT: 64 IN | OXYGEN SATURATION: 100 % | DIASTOLIC BLOOD PRESSURE: 75 MMHG | BODY MASS INDEX: 22.2 KG/M2

## 2024-08-21 DIAGNOSIS — I25.10 ATHEROSCLEROTIC HEART DISEASE OF NATIVE CORONARY ARTERY W/OUT ANGINA PECTORIS: ICD-10-CM

## 2024-08-21 DIAGNOSIS — E78.5 HYPERLIPIDEMIA, UNSPECIFIED: ICD-10-CM

## 2024-08-21 PROCEDURE — 99214 OFFICE O/P EST MOD 30 MIN: CPT

## 2024-08-21 PROCEDURE — 93000 ELECTROCARDIOGRAM COMPLETE: CPT

## 2024-08-21 NOTE — PHYSICAL EXAM
[Well Developed] : well developed [Well Nourished] : well nourished [No Acute Distress] : no acute distress [Normal Conjunctiva] : normal conjunctiva [Normal Venous Pressure] : normal venous pressure [Carotid Bruit] : carotid bruit [Normal Rate] : normal [Normal S1] : normal S1 [Normal S2] : normal S2 [S3] : no S3 [S4] : no S4 [No Murmur] : no murmurs heard [No Pitting Edema] : no pitting edema present [Right Carotid Bruit] : no bruit heard over the right carotid [Left Carotid Bruit] : left carotid bruit heard [Right Femoral Bruit] : no bruit heard over the right femoral artery [Left Femoral Bruit] : no bruit heard over the left femoral artery [2+] : left 2+ [No Abnormalities] : the abdominal aorta was not enlarged and no bruit was heard [Clear Lung Fields] : clear lung fields [Good Air Entry] : good air entry [No Respiratory Distress] : no respiratory distress  [Soft] : abdomen soft [Non Tender] : non-tender [No Masses/organomegaly] : no masses/organomegaly [Normal Bowel Sounds] : normal bowel sounds [Normal Gait] : normal gait [No Edema] : no edema [No Cyanosis] : no cyanosis [No Clubbing] : no clubbing [No Varicosities] : no varicosities [No Rash] : no rash [No Skin Lesions] : no skin lesions [Moves all extremities] : moves all extremities [No Focal Deficits] : no focal deficits [Normal Speech] : normal speech [Alert and Oriented] : alert and oriented [Normal memory] : normal memory [de-identified] : left

## 2024-08-21 NOTE — HISTORY OF PRESENT ILLNESS
[FreeTextEntry1] : Pippa is a 69 year old female here for follow up evaluation.  She presented to to the hospital on 5/5/2019 with an episode of palpitations, and chest pounding after eating Chinese food. She was found to have a mild troponin leak, was treated for NSTEMI, and eventually transferred for cardiac catheterization. Her cardiac catheterization was notable for a 30% RCA lesion, but no obstructive coronary disease.  I last saw her 2/24.  She is here today for followup. She is feeling well overall. She is smoking a few cigarettes/day. She denies significant chest pain and significant palpitations. She is currently not on any medications other than asa and Lipitor. Her LDL improved to 70's Pharm stress in 2023 without ischemia. Carotid sonogram in 2020 demonstrated 50-79% stenosis of her left ICA, unchanged from 2019.  A repeat sonogram in 1/22 demonstrated moderate atherosclerosis, with lower velocities on the left.  There was antegrade left vertebral artery waveform with presubclavian steal pattern. I subsequently sent her for an MRA, which was not interpretable because of motion artifact a CTA was recommended. She has since had a CT angio of her neck which demonstrated moderate stenosis of her proximal left subclavian artery.  She has no lower extremity swelling, orthopnea, PND, dizziness.  She feels well overall.  She also denies syncope. LDL was 67 in 2024.

## 2024-08-21 NOTE — DISCUSSION/SUMMARY
[With Me] : with me [___ Month(s)] : in [unfilled] month(s) [FreeTextEntry1] : Pippa is a 69 year old female here for follow up evaluation. She has nonobstructive CAD based on a cath in 2019.  Her blood pressure is near goal (and equal in the 120's in both arms).  Her physical exam is unremarkable, other than a left carotid/subclavian bruit. Her EKG demonstrates a sinus rhythm without obvious ischemia or chamber enlargement. Her LV function is normal.  A CTA of her neck did confirm moderate left subclavian stenosis.  There is some progression of her left sided ICA disease, and her LDL is now <70 on an increased dose of statin. I have again stressed the need for diet, exercise, and smoking cessation, to reduce her overall cv risk. She will remain on ASA.  If no issues, I will see her again in 6 months. We will repeat a carotid doppler in early 2025. [EKG obtained to assist in diagnosis and management of assessed problem(s)] : EKG obtained to assist in diagnosis and management of assessed problem(s)

## 2024-12-12 ENCOUNTER — RX RENEWAL (OUTPATIENT)
Age: 69
End: 2024-12-12

## 2025-02-18 ENCOUNTER — APPOINTMENT (OUTPATIENT)
Dept: CARDIOLOGY | Facility: CLINIC | Age: 70
End: 2025-02-18
Payer: MEDICARE

## 2025-02-18 PROCEDURE — 93880 EXTRACRANIAL BILAT STUDY: CPT

## 2025-02-20 ENCOUNTER — NON-APPOINTMENT (OUTPATIENT)
Age: 70
End: 2025-02-20

## 2025-02-24 ENCOUNTER — NON-APPOINTMENT (OUTPATIENT)
Age: 70
End: 2025-02-24

## 2025-02-24 ENCOUNTER — APPOINTMENT (OUTPATIENT)
Dept: CARDIOLOGY | Facility: CLINIC | Age: 70
End: 2025-02-24
Payer: MEDICARE

## 2025-02-24 VITALS
SYSTOLIC BLOOD PRESSURE: 137 MMHG | HEIGHT: 64 IN | WEIGHT: 132 LBS | OXYGEN SATURATION: 100 % | HEART RATE: 60 BPM | BODY MASS INDEX: 22.53 KG/M2 | DIASTOLIC BLOOD PRESSURE: 72 MMHG

## 2025-02-24 VITALS — DIASTOLIC BLOOD PRESSURE: 72 MMHG | SYSTOLIC BLOOD PRESSURE: 122 MMHG

## 2025-02-24 DIAGNOSIS — I65.29 OCCLUSION AND STENOSIS OF UNSPECIFIED CAROTID ARTERY: ICD-10-CM

## 2025-02-24 DIAGNOSIS — E78.5 HYPERLIPIDEMIA, UNSPECIFIED: ICD-10-CM

## 2025-02-24 PROCEDURE — 99214 OFFICE O/P EST MOD 30 MIN: CPT

## 2025-02-24 PROCEDURE — 93000 ELECTROCARDIOGRAM COMPLETE: CPT

## 2025-06-13 NOTE — DIETITIAN INITIAL EVALUATION ADULT. - PROBLEM SELECTOR PROBLEM 1
Want to Say “Thank You” to a Nurse?  The JEOVANY Award® was created in memory of ARUN Nowak by his family to say thank you to nurses who provide an outstanding level of care.    Submit a nomination using any method below.     OR    [email or web address]  Or visit the Resource section   on your [employer] tahir     
Acute hyponatremia

## 2025-07-10 NOTE — ED ADULT NURSE NOTE - CHIEF COMPLAINT QUOTE
Pt reports a UTI & diarrhea x 4days, pt states she thinks she might have COVID was tested yesterday- no results yet START ON PATHWAY REGIMEN - Breast    [id number]        Pembrolizumab (Keytruda)       Paclitaxel       Carboplatin       Filgrastim-xxxx       Pembrolizumab (Keytruda)       Doxorubicin       Cyclophosphamide       Pegfilgrastim-xxxx     **Always confirm dose/schedule in your pharmacy ordering system**    Patient Characteristics:  Preoperative or Nonsurgical Candidate, M0 (Clinical Staging), Up to cT4c, Any N, M0, Neoadjuvant Therapy followed by Surgery, Invasive Disease, Chemotherapy, HER2 Negative, ER Negative, Platinum Therapy Indicated and Candidate for Checkpoint Inhibitor  Therapeutic Status: Preoperative or Nonsurgical Candidate, M0 (Clinical Staging)  AJCC M Category: cM0  AJCC Grade: G3  ER Status: Negative (-)  AJCC 8 Stage Grouping: IIIB  HER2 Status: Negative (-)  AJCC T Category: cT2  AJCC N Category: cN1  UT Status: Negative (-)  Breast Surgical Plan: Neoadjuvant Therapy followed by Surgery

## 2025-07-30 NOTE — ED ADULT TRIAGE NOTE - CHIEF COMPLAINT QUOTE
Pt feels like heart is racing and pounding for about 2 hours-chest pressure
Specialty Care (immediate).../Connect patient to Primary Care...

## 2025-08-27 ENCOUNTER — APPOINTMENT (OUTPATIENT)
Dept: CARDIOLOGY | Facility: CLINIC | Age: 70
End: 2025-08-27
Payer: MEDICARE

## 2025-08-27 VITALS — DIASTOLIC BLOOD PRESSURE: 72 MMHG | SYSTOLIC BLOOD PRESSURE: 142 MMHG

## 2025-08-27 VITALS
HEART RATE: 64 BPM | BODY MASS INDEX: 21.34 KG/M2 | OXYGEN SATURATION: 100 % | SYSTOLIC BLOOD PRESSURE: 148 MMHG | HEIGHT: 64 IN | DIASTOLIC BLOOD PRESSURE: 78 MMHG | WEIGHT: 125 LBS

## 2025-08-27 DIAGNOSIS — E78.5 HYPERLIPIDEMIA, UNSPECIFIED: ICD-10-CM

## 2025-08-27 DIAGNOSIS — I65.29 OCCLUSION AND STENOSIS OF UNSPECIFIED CAROTID ARTERY: ICD-10-CM

## 2025-08-27 PROCEDURE — 93000 ELECTROCARDIOGRAM COMPLETE: CPT

## 2025-08-27 PROCEDURE — 99214 OFFICE O/P EST MOD 30 MIN: CPT
